# Patient Record
Sex: MALE
[De-identification: names, ages, dates, MRNs, and addresses within clinical notes are randomized per-mention and may not be internally consistent; named-entity substitution may affect disease eponyms.]

---

## 2017-03-08 ENCOUNTER — NON-APPOINTMENT (OUTPATIENT)
Age: 62
End: 2017-03-08

## 2017-03-08 ENCOUNTER — APPOINTMENT (OUTPATIENT)
Dept: ELECTROPHYSIOLOGY | Facility: CLINIC | Age: 62
End: 2017-03-08

## 2017-03-08 VITALS
BODY MASS INDEX: 26.28 KG/M2 | DIASTOLIC BLOOD PRESSURE: 70 MMHG | HEART RATE: 65 BPM | SYSTOLIC BLOOD PRESSURE: 101 MMHG | WEIGHT: 194 LBS | HEIGHT: 72 IN

## 2017-06-15 ENCOUNTER — APPOINTMENT (OUTPATIENT)
Dept: ELECTROPHYSIOLOGY | Facility: CLINIC | Age: 62
End: 2017-06-15

## 2017-09-13 ENCOUNTER — APPOINTMENT (OUTPATIENT)
Dept: ELECTROPHYSIOLOGY | Facility: CLINIC | Age: 62
End: 2017-09-13
Payer: COMMERCIAL

## 2017-09-13 VITALS — DIASTOLIC BLOOD PRESSURE: 64 MMHG | HEART RATE: 59 BPM | OXYGEN SATURATION: 97 % | SYSTOLIC BLOOD PRESSURE: 98 MMHG

## 2017-09-13 PROCEDURE — 93283 PRGRMG EVAL IMPLANTABLE DFB: CPT

## 2017-12-14 ENCOUNTER — APPOINTMENT (OUTPATIENT)
Dept: ELECTROPHYSIOLOGY | Facility: CLINIC | Age: 62
End: 2017-12-14

## 2017-12-14 ENCOUNTER — APPOINTMENT (OUTPATIENT)
Dept: ELECTROPHYSIOLOGY | Facility: CLINIC | Age: 62
End: 2017-12-14
Payer: COMMERCIAL

## 2017-12-14 PROCEDURE — 93295 DEV INTERROG REMOTE 1/2/MLT: CPT

## 2018-01-17 ENCOUNTER — RX RENEWAL (OUTPATIENT)
Age: 63
End: 2018-01-17

## 2018-02-01 ENCOUNTER — APPOINTMENT (OUTPATIENT)
Dept: CARDIOLOGY | Facility: CLINIC | Age: 63
End: 2018-02-01
Payer: COMMERCIAL

## 2018-02-01 VITALS
HEIGHT: 72 IN | HEART RATE: 68 BPM | BODY MASS INDEX: 28.17 KG/M2 | DIASTOLIC BLOOD PRESSURE: 70 MMHG | WEIGHT: 208 LBS | RESPIRATION RATE: 15 BRPM | SYSTOLIC BLOOD PRESSURE: 100 MMHG

## 2018-02-01 DIAGNOSIS — Z78.9 OTHER SPECIFIED HEALTH STATUS: ICD-10-CM

## 2018-02-01 DIAGNOSIS — Z83.49 FAMILY HISTORY OF OTHER ENDOCRINE, NUTRITIONAL AND METABOLIC DISEASES: ICD-10-CM

## 2018-02-01 PROCEDURE — 99213 OFFICE O/P EST LOW 20 MIN: CPT

## 2018-02-15 ENCOUNTER — APPOINTMENT (OUTPATIENT)
Dept: CARDIOLOGY | Facility: CLINIC | Age: 63
End: 2018-02-15

## 2018-03-28 ENCOUNTER — APPOINTMENT (OUTPATIENT)
Dept: CARDIOLOGY | Facility: CLINIC | Age: 63
End: 2018-03-28
Payer: COMMERCIAL

## 2018-03-28 PROCEDURE — 93922 UPR/L XTREMITY ART 2 LEVELS: CPT

## 2018-03-28 PROCEDURE — 93306 TTE W/DOPPLER COMPLETE: CPT

## 2018-04-17 ENCOUNTER — RX RENEWAL (OUTPATIENT)
Age: 63
End: 2018-04-17

## 2018-04-18 ENCOUNTER — APPOINTMENT (OUTPATIENT)
Dept: ELECTROPHYSIOLOGY | Facility: CLINIC | Age: 63
End: 2018-04-18
Payer: COMMERCIAL

## 2018-04-18 VITALS
DIASTOLIC BLOOD PRESSURE: 65 MMHG | HEART RATE: 65 BPM | WEIGHT: 210 LBS | BODY MASS INDEX: 28.44 KG/M2 | SYSTOLIC BLOOD PRESSURE: 97 MMHG | HEIGHT: 72 IN | OXYGEN SATURATION: 97 %

## 2018-04-18 PROCEDURE — 93283 PRGRMG EVAL IMPLANTABLE DFB: CPT

## 2018-05-17 ENCOUNTER — APPOINTMENT (OUTPATIENT)
Dept: CARDIOLOGY | Facility: CLINIC | Age: 63
End: 2018-05-17
Payer: COMMERCIAL

## 2018-05-17 VITALS
HEART RATE: 66 BPM | DIASTOLIC BLOOD PRESSURE: 78 MMHG | RESPIRATION RATE: 15 BRPM | SYSTOLIC BLOOD PRESSURE: 110 MMHG | HEIGHT: 72 IN | BODY MASS INDEX: 29.53 KG/M2 | WEIGHT: 218 LBS

## 2018-05-17 PROCEDURE — 99214 OFFICE O/P EST MOD 30 MIN: CPT

## 2018-06-21 ENCOUNTER — APPOINTMENT (OUTPATIENT)
Dept: ELECTROPHYSIOLOGY | Facility: CLINIC | Age: 63
End: 2018-06-21

## 2018-07-19 DIAGNOSIS — R06.83 SNORING: ICD-10-CM

## 2018-07-19 DIAGNOSIS — E87.70 FLUID OVERLOAD, UNSPECIFIED: ICD-10-CM

## 2018-08-08 ENCOUNTER — APPOINTMENT (OUTPATIENT)
Dept: PULMONOLOGY | Facility: CLINIC | Age: 63
End: 2018-08-08

## 2018-09-13 ENCOUNTER — APPOINTMENT (OUTPATIENT)
Dept: CARDIOLOGY | Facility: CLINIC | Age: 63
End: 2018-09-13

## 2018-09-27 ENCOUNTER — APPOINTMENT (OUTPATIENT)
Dept: CARDIOLOGY | Facility: CLINIC | Age: 63
End: 2018-09-27
Payer: MEDICARE

## 2018-09-27 VITALS
HEART RATE: 56 BPM | WEIGHT: 224 LBS | BODY MASS INDEX: 30.34 KG/M2 | RESPIRATION RATE: 15 BRPM | SYSTOLIC BLOOD PRESSURE: 118 MMHG | DIASTOLIC BLOOD PRESSURE: 79 MMHG | HEIGHT: 72 IN

## 2018-09-27 PROCEDURE — 99214 OFFICE O/P EST MOD 30 MIN: CPT

## 2018-10-17 ENCOUNTER — APPOINTMENT (OUTPATIENT)
Dept: ELECTROPHYSIOLOGY | Facility: CLINIC | Age: 63
End: 2018-10-17
Payer: COMMERCIAL

## 2018-10-17 VITALS
WEIGHT: 224 LBS | HEIGHT: 72 IN | HEART RATE: 70 BPM | DIASTOLIC BLOOD PRESSURE: 67 MMHG | OXYGEN SATURATION: 98 % | BODY MASS INDEX: 30.34 KG/M2 | SYSTOLIC BLOOD PRESSURE: 98 MMHG

## 2018-10-17 PROCEDURE — 93283 PRGRMG EVAL IMPLANTABLE DFB: CPT

## 2018-11-28 ENCOUNTER — APPOINTMENT (OUTPATIENT)
Dept: PULMONOLOGY | Facility: CLINIC | Age: 63
End: 2018-11-28
Payer: MEDICARE

## 2018-11-28 VITALS — DIASTOLIC BLOOD PRESSURE: 77 MMHG | HEART RATE: 65 BPM | OXYGEN SATURATION: 96 % | SYSTOLIC BLOOD PRESSURE: 103 MMHG

## 2018-11-28 DIAGNOSIS — G47.33 OBSTRUCTIVE SLEEP APNEA (ADULT) (PEDIATRIC): ICD-10-CM

## 2018-11-28 DIAGNOSIS — J44.9 CHRONIC OBSTRUCTIVE PULMONARY DISEASE, UNSPECIFIED: ICD-10-CM

## 2018-11-28 DIAGNOSIS — Z23 ENCOUNTER FOR IMMUNIZATION: ICD-10-CM

## 2018-11-28 PROCEDURE — 99214 OFFICE O/P EST MOD 30 MIN: CPT | Mod: 25

## 2018-11-28 PROCEDURE — G0008: CPT

## 2018-11-28 PROCEDURE — 94060 EVALUATION OF WHEEZING: CPT

## 2018-11-28 PROCEDURE — 94727 GAS DIL/WSHOT DETER LNG VOL: CPT

## 2018-11-28 PROCEDURE — 94729 DIFFUSING CAPACITY: CPT | Mod: 59

## 2018-11-28 PROCEDURE — 90662 IIV NO PRSV INCREASED AG IM: CPT

## 2018-11-30 ENCOUNTER — RX RENEWAL (OUTPATIENT)
Age: 63
End: 2018-11-30

## 2018-12-13 ENCOUNTER — APPOINTMENT (OUTPATIENT)
Dept: CARDIOLOGY | Facility: CLINIC | Age: 63
End: 2018-12-13
Payer: MEDICARE

## 2018-12-13 VITALS
SYSTOLIC BLOOD PRESSURE: 118 MMHG | HEART RATE: 65 BPM | HEIGHT: 72 IN | RESPIRATION RATE: 15 BRPM | DIASTOLIC BLOOD PRESSURE: 78 MMHG | WEIGHT: 228 LBS | BODY MASS INDEX: 30.88 KG/M2

## 2018-12-13 PROCEDURE — 99214 OFFICE O/P EST MOD 30 MIN: CPT

## 2018-12-13 NOTE — DISCUSSION/SUMMARY
[FreeTextEntry1] : This is a 63-year-old male with past medical history significant for coronary artery disease status post ST elevation myocardial infarction, followed by emergent coronary artery bypass surgery May 27, 2016 at [facility], left ventricular dysfunction, history of hypertension, hyperlipidemia, mitral valve regurgitation, status post AICD, comes in for Followup cardiac evaluation. \par He denies chest pain, shortness of breath, dizziness or syncope.\par The patient is doing well from a cardiac standpoint.. He will followup with the heart failure team at [facility]. He will also followup with his electrophysiologist regarding his AICD.\par \par The patient feels well at the current time. His LDL cholesterol is still not at goal. I will add Zetia 10 mg daily to her medical regimen. He will have repeat blood work in 6-8 weeks. His LDL cholesterol goal should be less than 70 mg/dL. He will followup with electrophysiology team to check his pacemaker. He will followup with me as noted above. He will continue on his other medications.\par \par The patient is on Entresto and followed closely by Dr. Bennett of [facility]. \par The patient had an echo Doppler examination November 2016 which demonstrated severe left ventricular dysfunction with an estimated ejection fraction 37% the patient had moderate to severe mitral valve regurgitation, moderate tricuspid valve regurgitation. He is feeling well at the current time. A cardiac catheterization done at [facility] May 27, 2016 demonstrated  May 27, 2016 demonstrated large thrombus in the distal left main coronary artery, and the patient had an emergent thrombectomy and angioplasty. He was placed on intra-aortic balloon pump. He was noted to have severe mitral valve regurgitation at that time in the setting of his ST elevation myocardial infarction, 50% lesion the mid left anterior descending artery, 60% lesion the second diagonal artery, 50% lesion in the ramus branch, and 40% lesion in the posterior descending artery.\par \par The patient understands that aerobic exercises must be increased to 40 minutes 4 times per week. A detailed discussion of lifestyle modification was done today. The patient has a good understanding of the diagnosis, and treatment plan. Lifestyle modification was also outlined.

## 2018-12-13 NOTE — ASSESSMENT
[FreeTextEntry1] : Pt. most recent bloodwork done 11/02/18, shows a calculated LDL score of 79. Pt's LDL goal should be under 70. Pt. is already on Liptor 80mg, we will now add Zetia 10mg.

## 2018-12-13 NOTE — PHYSICAL EXAM
[General Appearance - Well Developed] : well developed [General Appearance - Well Nourished] : well nourished [Normal Conjunctiva] : the conjunctiva exhibited no abnormalities [Normal Oral Mucosa] : normal oral mucosa [Normal Jugular Venous A Waves Present] : normal jugular venous A waves present [Normal Jugular Venous V Waves Present] : normal jugular venous V waves present [No Jugular Venous Blanco A Waves] : no jugular venous blanco A waves [Respiration, Rhythm And Depth] : normal respiratory rhythm and effort [Exaggerated Use Of Accessory Muscles For Inspiration] : no accessory muscle use [Auscultation Breath Sounds / Voice Sounds] : lungs were clear to auscultation bilaterally [Bowel Sounds] : normal bowel sounds [Abdomen Soft] : soft [Abnormal Walk] : normal gait [Gait - Sufficient For Exercise Testing] : the gait was sufficient for exercise testing [Nail Clubbing] : no clubbing of the fingernails [Cyanosis, Localized] : no localized cyanosis [Skin Color & Pigmentation] : normal skin color and pigmentation [] : no rash [Oriented To Time, Place, And Person] : oriented to person, place, and time [Affect] : the affect was normal [Mood] : the mood was normal [No Anxiety] : not feeling anxious [5th Left ICS - MCL] : palpated at the 5th LICS in the midclavicular line [Normal] : normal [No Precordial Heave] : no precordial heave was noted [Normal Rate] : normal [Rhythm Regular] : regular [Normal S1] : normal S1 [Normal S2] : normal S2 [No Gallop] : no gallop heard [I] : a grade 1 [2+] : left 2+ [No Pitting Edema] : no pitting edema present [Apical Thrill] : no thrill palpable at the apex [Click] : no click [Pericardial Rub] : no pericardial rub

## 2018-12-13 NOTE — REASON FOR VISIT
[Follow-Up - Clinic] : a clinic follow-up of [Coronary Artery Disease] : coronary artery disease [Hyperlipidemia] : hyperlipidemia [Hypertension] : hypertension [FreeTextEntry1] : 63-year-old male with past medical history significant for coronary artery disease status post ST elevation myocardial infarction, followed by emergent coronary artery bypass surgery May 27, 2016 at [facility], left ventricular dysfunction, history of hypertension, hyperlipidemia, mitral valve regurgitation, status post AICD, comes in for evaluation.

## 2018-12-18 RX ORDER — UMECLIDINIUM 62.5 UG/1
62.5 AEROSOL, POWDER ORAL DAILY
Qty: 1 | Refills: 3 | Status: COMPLETED | COMMUNITY
Start: 2018-11-30 | End: 2018-12-18

## 2018-12-27 ENCOUNTER — APPOINTMENT (OUTPATIENT)
Dept: ELECTROPHYSIOLOGY | Facility: CLINIC | Age: 63
End: 2018-12-27
Payer: MEDICARE

## 2018-12-27 PROCEDURE — XXXXX: CPT

## 2019-04-04 ENCOUNTER — APPOINTMENT (OUTPATIENT)
Dept: CARDIOLOGY | Facility: CLINIC | Age: 64
End: 2019-04-04
Payer: MEDICARE

## 2019-04-04 VITALS
DIASTOLIC BLOOD PRESSURE: 78 MMHG | BODY MASS INDEX: 31.29 KG/M2 | WEIGHT: 231 LBS | RESPIRATION RATE: 15 BRPM | SYSTOLIC BLOOD PRESSURE: 108 MMHG | HEART RATE: 62 BPM | HEIGHT: 72 IN

## 2019-04-04 PROCEDURE — 99214 OFFICE O/P EST MOD 30 MIN: CPT

## 2019-04-04 NOTE — DISCUSSION/SUMMARY
[FreeTextEntry1] : This is a 63-year-old male with past medical history significant for coronary artery disease status post ST elevation myocardial infarction, followed by emergent coronary artery bypass surgery May 27, 2016 at [facility], left ventricular dysfunction, history of hypertension, hyperlipidemia, mitral valve regurgitation, status post AICD, comes in for Followup cardiac evaluation. \par He denies chest pain, shortness of breath, dizziness or syncope.\par He will follow up with the heart failure team at [facility].  He will also go for blood work in the next month to check his electrolytes, and lipid panel.  He will remain on his usual medications.  He reports that the Zetia generic is becoming too expensive.  I have asked him to check up bracing and other pharmacies.\par He will followup with me in 3 months.  Lifestyle modification including salt restriction, and regular aerobic activity will reinforced.\par His LDL cholesterol goal should be less than 70 mg/dL.  This study it turns out to be too expensive, I would then recommend discontinuing Lipitor 80 mg daily and place him on Crestor 40 mg per day and consider the addition of WelChol.\par PMH:\par The patient is on Entresto and followed closely by Dr. Bennett of [facility]. \par The patient had an echo Doppler examination November 2016 which demonstrated severe left ventricular dysfunction with an estimated ejection fraction 37% the patient had moderate to severe mitral valve regurgitation, moderate tricuspid valve regurgitation. He is feeling well at the current time. A cardiac catheterization done at [facility] May 27, 2016 demonstrated  May 27, 2016 demonstrated large thrombus in the distal left main coronary artery, and the patient had an emergent thrombectomy and angioplasty. He was placed on intra-aortic balloon pump. He was noted to have severe mitral valve regurgitation at that time in the setting of his ST elevation myocardial infarction, 50% lesion the mid left anterior descending artery, 60% lesion the second diagonal artery, 50% lesion in the ramus branch, and 40% lesion in the posterior descending artery.\par \par The patient understands that aerobic exercises must be increased to 40 minutes 4 times per week. A detailed discussion of lifestyle modification was done today. The patient has a good understanding of the diagnosis, and treatment plan. Lifestyle modification was also outlined.

## 2019-04-04 NOTE — REASON FOR VISIT
[Follow-Up - Clinic] : a clinic follow-up of [Coronary Artery Disease] : coronary artery disease [Hyperlipidemia] : hyperlipidemia [Hypertension] : hypertension [FreeTextEntry1] : 63-year-old male with past medical history significant for coronary artery disease status post ST elevation myocardial infarction, followed by emergent coronary artery bypass surgery May 27, 2016 at [facility], left ventricular dysfunction, history of hypertension, hyperlipidemia, mitral valve regurgitation, status post AICD, comes in for evaluation.

## 2019-04-17 ENCOUNTER — APPOINTMENT (OUTPATIENT)
Dept: ELECTROPHYSIOLOGY | Facility: CLINIC | Age: 64
End: 2019-04-17

## 2019-04-17 ENCOUNTER — APPOINTMENT (OUTPATIENT)
Dept: ELECTROPHYSIOLOGY | Facility: CLINIC | Age: 64
End: 2019-04-17
Payer: MEDICARE

## 2019-04-17 VITALS
HEIGHT: 72 IN | WEIGHT: 225 LBS | DIASTOLIC BLOOD PRESSURE: 71 MMHG | SYSTOLIC BLOOD PRESSURE: 106 MMHG | HEART RATE: 70 BPM | OXYGEN SATURATION: 96 % | BODY MASS INDEX: 30.48 KG/M2

## 2019-04-17 PROCEDURE — 93283 PRGRMG EVAL IMPLANTABLE DFB: CPT

## 2019-07-18 ENCOUNTER — APPOINTMENT (OUTPATIENT)
Dept: ELECTROPHYSIOLOGY | Facility: CLINIC | Age: 64
End: 2019-07-18

## 2019-09-05 ENCOUNTER — APPOINTMENT (OUTPATIENT)
Dept: CARDIOLOGY | Facility: CLINIC | Age: 64
End: 2019-09-05
Payer: MEDICARE

## 2019-09-05 VITALS
HEART RATE: 70 BPM | BODY MASS INDEX: 30.75 KG/M2 | WEIGHT: 227 LBS | SYSTOLIC BLOOD PRESSURE: 112 MMHG | HEIGHT: 72 IN | DIASTOLIC BLOOD PRESSURE: 74 MMHG | RESPIRATION RATE: 16 BRPM

## 2019-09-05 PROCEDURE — 93000 ELECTROCARDIOGRAM COMPLETE: CPT

## 2019-09-05 PROCEDURE — 99214 OFFICE O/P EST MOD 30 MIN: CPT

## 2019-09-09 RX ORDER — SACUBITRIL AND VALSARTAN 97; 103 MG/1; MG/1
97-103 TABLET, FILM COATED ORAL TWICE DAILY
Refills: 0 | Status: ACTIVE | COMMUNITY

## 2019-10-28 ENCOUNTER — APPOINTMENT (OUTPATIENT)
Dept: ELECTROPHYSIOLOGY | Facility: CLINIC | Age: 64
End: 2019-10-28
Payer: MEDICARE

## 2019-10-28 VITALS
DIASTOLIC BLOOD PRESSURE: 71 MMHG | HEIGHT: 72 IN | BODY MASS INDEX: 30.88 KG/M2 | SYSTOLIC BLOOD PRESSURE: 105 MMHG | OXYGEN SATURATION: 97 % | WEIGHT: 228 LBS | HEART RATE: 87 BPM

## 2019-10-28 PROCEDURE — 93283 PRGRMG EVAL IMPLANTABLE DFB: CPT

## 2019-11-27 RX ORDER — EZETIMIBE 10 MG/1
10 TABLET ORAL
Qty: 90 | Refills: 1 | Status: ACTIVE | COMMUNITY
Start: 2018-12-13 | End: 1900-01-01

## 2020-03-05 ENCOUNTER — APPOINTMENT (OUTPATIENT)
Dept: CARDIOLOGY | Facility: CLINIC | Age: 65
End: 2020-03-05
Payer: MEDICARE

## 2020-03-05 VITALS
RESPIRATION RATE: 16 BRPM | DIASTOLIC BLOOD PRESSURE: 72 MMHG | WEIGHT: 229 LBS | SYSTOLIC BLOOD PRESSURE: 105 MMHG | HEART RATE: 67 BPM | HEIGHT: 72 IN | BODY MASS INDEX: 31.02 KG/M2

## 2020-03-05 PROCEDURE — 93000 ELECTROCARDIOGRAM COMPLETE: CPT

## 2020-03-05 PROCEDURE — 99214 OFFICE O/P EST MOD 30 MIN: CPT

## 2020-03-22 ENCOUNTER — INPATIENT (INPATIENT)
Facility: HOSPITAL | Age: 65
LOS: 3 days | Discharge: HOME | DRG: 372 | End: 2020-03-26
Attending: INTERNAL MEDICINE | Admitting: INTERNAL MEDICINE
Payer: MEDICARE

## 2020-03-22 VITALS
TEMPERATURE: 98 F | SYSTOLIC BLOOD PRESSURE: 90 MMHG | OXYGEN SATURATION: 96 % | HEART RATE: 98 BPM | RESPIRATION RATE: 20 BRPM | DIASTOLIC BLOOD PRESSURE: 65 MMHG

## 2020-03-22 DIAGNOSIS — R55 SYNCOPE AND COLLAPSE: ICD-10-CM

## 2020-03-22 DIAGNOSIS — Z95.1 PRESENCE OF AORTOCORONARY BYPASS GRAFT: Chronic | ICD-10-CM

## 2020-03-22 LAB
ALBUMIN SERPL ELPH-MCNC: 3.6 G/DL — SIGNIFICANT CHANGE UP (ref 3.3–5)
ALP SERPL-CCNC: 31 U/L — LOW (ref 40–120)
ALT FLD-CCNC: 39 U/L — SIGNIFICANT CHANGE UP (ref 10–45)
ANION GAP SERPL CALC-SCNC: 17 MMOL/L — SIGNIFICANT CHANGE UP (ref 5–17)
APPEARANCE UR: CLEAR — SIGNIFICANT CHANGE UP
APTT BLD: 34.5 SEC — SIGNIFICANT CHANGE UP (ref 27.5–36.3)
AST SERPL-CCNC: 36 U/L — SIGNIFICANT CHANGE UP (ref 10–40)
BACTERIA # UR AUTO: NEGATIVE — SIGNIFICANT CHANGE UP
BASOPHILS # BLD AUTO: 0.02 K/UL — SIGNIFICANT CHANGE UP (ref 0–0.2)
BASOPHILS NFR BLD AUTO: 0.2 % — SIGNIFICANT CHANGE UP (ref 0–2)
BILIRUB SERPL-MCNC: 1 MG/DL — SIGNIFICANT CHANGE UP (ref 0.2–1.2)
BILIRUB UR-MCNC: NEGATIVE — SIGNIFICANT CHANGE UP
BLD GP AB SCN SERPL QL: NEGATIVE — SIGNIFICANT CHANGE UP
BUN SERPL-MCNC: 40 MG/DL — HIGH (ref 7–23)
C DIFF BY PCR RESULT: DETECTED
C DIFF GDH STL QL: SIGNIFICANT CHANGE UP
C DIFF GDH STL QL: SIGNIFICANT CHANGE UP
C DIFF TOX GENS STL QL NAA+PROBE: SIGNIFICANT CHANGE UP
CALCIUM SERPL-MCNC: 8.3 MG/DL — LOW (ref 8.4–10.5)
CHLORIDE SERPL-SCNC: 97 MMOL/L — SIGNIFICANT CHANGE UP (ref 96–108)
CO2 SERPL-SCNC: 18 MMOL/L — LOW (ref 22–31)
COLOR SPEC: YELLOW — SIGNIFICANT CHANGE UP
CREAT SERPL-MCNC: 1.6 MG/DL — HIGH (ref 0.5–1.3)
DIFF PNL FLD: NEGATIVE — SIGNIFICANT CHANGE UP
EOSINOPHIL # BLD AUTO: 0.08 K/UL — SIGNIFICANT CHANGE UP (ref 0–0.5)
EOSINOPHIL NFR BLD AUTO: 0.7 % — SIGNIFICANT CHANGE UP (ref 0–6)
EPI CELLS # UR: 1 /HPF — SIGNIFICANT CHANGE UP
GAS PNL BLDV: SIGNIFICANT CHANGE UP
GAS PNL BLDV: SIGNIFICANT CHANGE UP
GLUCOSE SERPL-MCNC: 131 MG/DL — HIGH (ref 70–99)
GLUCOSE UR QL: NEGATIVE — SIGNIFICANT CHANGE UP
HCT VFR BLD CALC: 46.1 % — SIGNIFICANT CHANGE UP (ref 39–50)
HGB BLD-MCNC: 15.4 G/DL — SIGNIFICANT CHANGE UP (ref 13–17)
HYALINE CASTS # UR AUTO: 2 /LPF — SIGNIFICANT CHANGE UP (ref 0–2)
IMM GRANULOCYTES NFR BLD AUTO: 0.3 % — SIGNIFICANT CHANGE UP (ref 0–1.5)
INR BLD: 1.16 RATIO — SIGNIFICANT CHANGE UP (ref 0.88–1.16)
KETONES UR-MCNC: NEGATIVE — SIGNIFICANT CHANGE UP
LEUKOCYTE ESTERASE UR-ACNC: NEGATIVE — SIGNIFICANT CHANGE UP
LYMPHOCYTES # BLD AUTO: 1.21 K/UL — SIGNIFICANT CHANGE UP (ref 1–3.3)
LYMPHOCYTES # BLD AUTO: 10.1 % — LOW (ref 13–44)
MCHC RBC-ENTMCNC: 29.7 PG — SIGNIFICANT CHANGE UP (ref 27–34)
MCHC RBC-ENTMCNC: 33.4 GM/DL — SIGNIFICANT CHANGE UP (ref 32–36)
MCV RBC AUTO: 88.8 FL — SIGNIFICANT CHANGE UP (ref 80–100)
MONOCYTES # BLD AUTO: 0.92 K/UL — HIGH (ref 0–0.9)
MONOCYTES NFR BLD AUTO: 7.7 % — SIGNIFICANT CHANGE UP (ref 2–14)
NEUTROPHILS # BLD AUTO: 9.71 K/UL — HIGH (ref 1.8–7.4)
NEUTROPHILS NFR BLD AUTO: 81 % — HIGH (ref 43–77)
NITRITE UR-MCNC: NEGATIVE — SIGNIFICANT CHANGE UP
NRBC # BLD: 0 /100 WBCS — SIGNIFICANT CHANGE UP (ref 0–0)
PH UR: 6 — SIGNIFICANT CHANGE UP (ref 5–8)
PLATELET # BLD AUTO: 138 K/UL — LOW (ref 150–400)
POTASSIUM SERPL-MCNC: 4.8 MMOL/L — SIGNIFICANT CHANGE UP (ref 3.5–5.3)
POTASSIUM SERPL-SCNC: 4.8 MMOL/L — SIGNIFICANT CHANGE UP (ref 3.5–5.3)
PROT SERPL-MCNC: 6.7 G/DL — SIGNIFICANT CHANGE UP (ref 6–8.3)
PROT UR-MCNC: ABNORMAL
PROTHROM AB SERPL-ACNC: 13.3 SEC — HIGH (ref 10–12.9)
RBC # BLD: 5.19 M/UL — SIGNIFICANT CHANGE UP (ref 4.2–5.8)
RBC # FLD: 15.5 % — HIGH (ref 10.3–14.5)
RBC CASTS # UR COMP ASSIST: 1 /HPF — SIGNIFICANT CHANGE UP (ref 0–4)
RH IG SCN BLD-IMP: POSITIVE — SIGNIFICANT CHANGE UP
SODIUM SERPL-SCNC: 132 MMOL/L — LOW (ref 135–145)
SP GR SPEC: 1.04 — HIGH (ref 1.01–1.02)
TROPONIN T, HIGH SENSITIVITY RESULT: 21 NG/L — SIGNIFICANT CHANGE UP (ref 0–51)
UROBILINOGEN FLD QL: NEGATIVE — SIGNIFICANT CHANGE UP
WBC # BLD: 11.97 K/UL — HIGH (ref 3.8–10.5)
WBC # FLD AUTO: 11.97 K/UL — HIGH (ref 3.8–10.5)
WBC UR QL: 1 /HPF — SIGNIFICANT CHANGE UP (ref 0–5)

## 2020-03-22 PROCEDURE — 70450 CT HEAD/BRAIN W/O DYE: CPT | Mod: 26

## 2020-03-22 PROCEDURE — 71260 CT THORAX DX C+: CPT | Mod: 26

## 2020-03-22 PROCEDURE — 93010 ELECTROCARDIOGRAM REPORT: CPT

## 2020-03-22 PROCEDURE — 74177 CT ABD & PELVIS W/CONTRAST: CPT | Mod: 26

## 2020-03-22 PROCEDURE — 71045 X-RAY EXAM CHEST 1 VIEW: CPT | Mod: 26

## 2020-03-22 PROCEDURE — 99221 1ST HOSP IP/OBS SF/LOW 40: CPT | Mod: AI

## 2020-03-22 PROCEDURE — 99285 EMERGENCY DEPT VISIT HI MDM: CPT

## 2020-03-22 RX ORDER — ATORVASTATIN CALCIUM 80 MG/1
80 TABLET, FILM COATED ORAL AT BEDTIME
Refills: 0 | Status: DISCONTINUED | OUTPATIENT
Start: 2020-03-22 | End: 2020-03-26

## 2020-03-22 RX ORDER — SODIUM CHLORIDE 9 MG/ML
1000 INJECTION INTRAMUSCULAR; INTRAVENOUS; SUBCUTANEOUS
Refills: 0 | Status: DISCONTINUED | OUTPATIENT
Start: 2020-03-22 | End: 2020-03-23

## 2020-03-22 RX ORDER — SODIUM CHLORIDE 9 MG/ML
2000 INJECTION INTRAMUSCULAR; INTRAVENOUS; SUBCUTANEOUS ONCE
Refills: 0 | Status: COMPLETED | OUTPATIENT
Start: 2020-03-22 | End: 2020-03-22

## 2020-03-22 RX ORDER — CLOPIDOGREL BISULFATE 75 MG/1
75 TABLET, FILM COATED ORAL DAILY
Refills: 0 | Status: DISCONTINUED | OUTPATIENT
Start: 2020-03-22 | End: 2020-03-26

## 2020-03-22 RX ORDER — FUROSEMIDE 40 MG
40 TABLET ORAL DAILY
Refills: 0 | Status: DISCONTINUED | OUTPATIENT
Start: 2020-03-22 | End: 2020-03-22

## 2020-03-22 RX ORDER — SODIUM CHLORIDE 9 MG/ML
250 INJECTION INTRAMUSCULAR; INTRAVENOUS; SUBCUTANEOUS ONCE
Refills: 0 | Status: COMPLETED | OUTPATIENT
Start: 2020-03-22 | End: 2020-03-22

## 2020-03-22 RX ORDER — LISINOPRIL 2.5 MG/1
2.5 TABLET ORAL DAILY
Refills: 0 | Status: DISCONTINUED | OUTPATIENT
Start: 2020-03-22 | End: 2020-03-22

## 2020-03-22 RX ORDER — HEPARIN SODIUM 5000 [USP'U]/ML
5000 INJECTION INTRAVENOUS; SUBCUTANEOUS EVERY 12 HOURS
Refills: 0 | Status: DISCONTINUED | OUTPATIENT
Start: 2020-03-22 | End: 2020-03-26

## 2020-03-22 RX ORDER — CARVEDILOL PHOSPHATE 80 MG/1
3.12 CAPSULE, EXTENDED RELEASE ORAL EVERY 12 HOURS
Refills: 0 | Status: DISCONTINUED | OUTPATIENT
Start: 2020-03-22 | End: 2020-03-22

## 2020-03-22 RX ORDER — ASPIRIN/CALCIUM CARB/MAGNESIUM 324 MG
81 TABLET ORAL DAILY
Refills: 0 | Status: DISCONTINUED | OUTPATIENT
Start: 2020-03-22 | End: 2020-03-26

## 2020-03-22 RX ADMIN — CLOPIDOGREL BISULFATE 75 MILLIGRAM(S): 75 TABLET, FILM COATED ORAL at 12:20

## 2020-03-22 RX ADMIN — Medication 81 MILLIGRAM(S): at 12:20

## 2020-03-22 RX ADMIN — SODIUM CHLORIDE 50 MILLILITER(S): 9 INJECTION INTRAMUSCULAR; INTRAVENOUS; SUBCUTANEOUS at 13:46

## 2020-03-22 RX ADMIN — HEPARIN SODIUM 5000 UNIT(S): 5000 INJECTION INTRAVENOUS; SUBCUTANEOUS at 17:37

## 2020-03-22 RX ADMIN — SODIUM CHLORIDE 250 MILLILITER(S): 9 INJECTION INTRAMUSCULAR; INTRAVENOUS; SUBCUTANEOUS at 12:18

## 2020-03-22 RX ADMIN — ATORVASTATIN CALCIUM 80 MILLIGRAM(S): 80 TABLET, FILM COATED ORAL at 21:10

## 2020-03-22 RX ADMIN — SODIUM CHLORIDE 2000 MILLILITER(S): 9 INJECTION INTRAMUSCULAR; INTRAVENOUS; SUBCUTANEOUS at 04:20

## 2020-03-22 NOTE — ED PROVIDER NOTE - PROGRESS NOTE DETAILS
Anuj SHETH MD PGY2: Patient's BP now stable at ~100/70. Patient with scant B lines, will hold fluid resuscitation for now. Given recurrent episodes of syncope, will admit for tele monitoring and fluids. Spoke to Dr Valentino whom accepted the admission. WIll admit to tele for syncope in a patient with AICD and significant medical history. Anuj SHETH MD PGY2: Patient was verbaly informed of the findings of his CT in relation to his gallbladder. Informed that it is likely contracted, but adenomyomatosis or gallbladder ca cannot be excluded. Patient verbalized that he needs to follow-up with a gastroenterologist ASAP after discharge from hospital for possible more advanced imaging.

## 2020-03-22 NOTE — ED ADULT NURSE NOTE - OBJECTIVE STATEMENT
63 yo M pmh of MI, stents placed, CHF, on lasix, on blood thinners, HTN brought in via EMS from home s/p syncope with 3 days of dark brown watery diarrhea. 63 yo M pmh of MI, stents placed, CHF, on lasix, on blood thinners, HTN brought in via EMS from home s/p syncope tonight "lasting a few minutes" after 3 days of dark brown watery diarrhea. As per EMS, pt received a liter of NS prior to arrival and felt better. Denies CP, back pain, SOB, fevers/chills, n/v, lightheadedness, dizziness, hitting his head.  A&Ox4, placed on CM, EKG performed, NSR.  Pt hypotensive on arrival, secondary IV established, labs drawn.  Skin w/d/i.  afebrile. Safety and comfort maintained. Will continue to monitor.

## 2020-03-22 NOTE — H&P ADULT - ASSESSMENT
: 64 M    h/o   MI/ stemi  2016 .  s/p    cardiogenic shock ,   CABG.  HLD.   s/p AICD placement  h/o low  EF, OF  20,   severe  MR. mod  pulm htn. in 2016  COPD.  OBESITY       here for 2 episode of syncope   in the setting of diarrhea x 2 days.      Has been having 3 episodes of profuse loose stools. No recent abx use.     Had two episodes of passing out, each time while he was standing up from a lying down   tele    orthostatic   low  sbp.  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 No pneumothorax. No pneumomediastinum.   Moderate upper lobe predominant centrilobular emphysema. No evidence of pneumonia.  Prominent gallbladder wall thickening in the body and fundus of the gallbladder up to 1 cm, which may be related to collapse, possibly adenomyomatosis; consider nonemergent contrast enhanced MRI for further evaluation as indicated.  < end of copied text >       < from: Transthoracic Echocardiogram (11.20.16 @ 18:09) >  1. Mitral annular calcification. Tethered mitral valve  leaflets with normal opening. Moderate-severe mitral  regurgitation.  2. Eccentric left ventricular hypertrophy (dilated left  ventricle with normal relative wall thickness).  3. Endocardial visualization enhanced with intravenous  injection of echo contrast (Definity). Severe global left  ventricular systolic dysfunction.  4. Normal right ventricular size with decreased right  ventricular systolic function.  5. Estimated right ventricular systolic pressure equals 58  mm Hg, assuming right atrial pressure equals 8 mm Hg,  < end of copied text > : 64 M    h/o   MI/ stemi  2016 .  s/p    cardiogenic shock ,   CABG.  HLD.   s/p AICD placement  h/o low  EF, OF  20,   severe  MR. mod  pulm htn. in 2016  COPD.  OBESITY       here for 2 episode of syncope   in the setting of diarrhea x 2 days.      Has been having 3 episodes of profuse loose stools. No recent abx use.     Had two episodes of passing out, each time while he was standing up from a lying down   tele    orthostatic   low  sbp.  hold  aldactone/ lasix  and lisinopril   for now   on asa/ plavix/  statin/  coreg   gentle  hydration  ct head, pending    ct chest/ abdomen,    sternal  dehiscence /  emphysema/ GB  adenomyosis  gi  eval/  ID eval. 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 No pneumothorax. No pneumomediastinum.   Moderate upper lobe predominant centrilobular emphysema. No evidence of pneumonia.  Prominent gallbladder wall thickening in the body and fundus of the gallbladder up to 1 cm, which may be related to collapse, possibly adenomyomatosis; consider nonemergent contrast enhanced MRI for further evaluation as indicated.  < end of copied text >       < from: Transthoracic Echocardiogram (11.20.16 @ 18:09) >  1. Mitral annular calcification. Tethered mitral valve  leaflets with normal opening. Moderate-severe mitral  regurgitation.  2. Eccentric left ventricular hypertrophy (dilated left  ventricle with normal relative wall thickness).  3. Endocardial visualization enhanced with intravenous  injection of echo contrast (Definity). Severe global left  ventricular systolic dysfunction.  4. Normal right ventricular size with decreased right  ventricular systolic function.  5. Estimated right ventricular systolic pressure equals 58  mm Hg, assuming right atrial pressure equals 8 mm Hg,  < end of copied text > : 64 M    h/o   MI/ stemi  2016 .  s/p    cardiogenic shock ,   CABG.  HLD.   s/p AICD placement  h/o low  EF, OF  20,   severe  MR. mod  pulm htn. in 2016  COPD.  OBESITY       here for 2 episode of syncope   in the setting of diarrhea x 2 days.      Has been having 3 episodes of profuse loose stools. No recent abx use.     Had two episodes of passing out, each time while he was standing up from a lying down   tele    orthostatic   low  sbp.  hold  aldactone/ lasix  and  coreg/  lisinopril   for now.  a s  sbp  is in 80's   on asa/ plavix/  statin/    gentle  hydration  ct head, pending    ct chest/ abdomen,    sternal  dehiscence /  emphysema/ GB  adenomyosis  gi  eval/  ID eval. 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 No pneumothorax. No pneumomediastinum.   Moderate upper lobe predominant centrilobular emphysema. No evidence of pneumonia.  Prominent gallbladder wall thickening in the body and fundus of the gallbladder up to 1 cm, which may be related to collapse, possibly adenomyomatosis; consider nonemergent contrast enhanced MRI for further evaluation as indicated.  < end of copied text >       < from: Transthoracic Echocardiogram (11.20.16 @ 18:09) >  1. Mitral annular calcification. Tethered mitral valve  leaflets with normal opening. Moderate-severe mitral  regurgitation.  2. Eccentric left ventricular hypertrophy (dilated left  ventricle with normal relative wall thickness).  3. Endocardial visualization enhanced with intravenous  injection of echo contrast (Definity). Severe global left  ventricular systolic dysfunction.  4. Normal right ventricular size with decreased right  ventricular systolic function.  5. Estimated right ventricular systolic pressure equals 58  mm Hg, assuming right atrial pressure equals 8 mm Hg,  < end of copied text > : 64 M    h/o   MI/ stemi  2016 .  s/p    cardiogenic shock ,   CABG.  HLD.   s/p AICD placement  h/o low  EF, OF  20,   severe  MR. mod  pulm htn. in 2016  COPD.  OBESITY       here for 2 episode of syncope   in the setting of diarrhea x 2 days.      Has been having 3 episodes of profuse loose stools. No recent abx use.     Had two episodes of passing out, each time while he was standing up from a lying down   tele    orthostatic   low  sbp.  hold  aldactone/ lasix  and  coreg/  lisinopril   for now.  a s  sbp  is in 80's   on asa/ plavix/  statin/    gentle  hydration  stool  c/s/  d  c  diff toxin  ct head, pending    ct chest/ abdomen,    sternal  dehiscence /  emphysema/ GB  adenomyosis  gi  eval/  ID eval. 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 No pneumothorax. No pneumomediastinum.   Moderate upper lobe predominant centrilobular emphysema. No evidence of pneumonia.  Prominent gallbladder wall thickening in the body and fundus of the gallbladder up to 1 cm, which may be related to collapse, possibly adenomyomatosis; consider nonemergent contrast enhanced MRI for further evaluation as indicated.  < end of copied text >       < from: Transthoracic Echocardiogram (11.20.16 @ 18:09) >  1. Mitral annular calcification. Tethered mitral valve  leaflets with normal opening. Moderate-severe mitral  regurgitation.  2. Eccentric left ventricular hypertrophy (dilated left  ventricle with normal relative wall thickness).  3. Endocardial visualization enhanced with intravenous  injection of echo contrast (Definity). Severe global left  ventricular systolic dysfunction.  4. Normal right ventricular size with decreased right  ventricular systolic function.  5. Estimated right ventricular systolic pressure equals 58  mm Hg, assuming right atrial pressure equals 8 mm Hg,  < end of copied text >

## 2020-03-22 NOTE — H&P ADULT - NSHPPHYSICALEXAM_GEN_ALL_CORE
PHYSICAL EXAMINATION:  Vital Signs Last 24 Hrs  T(C): 36.6 (22 Mar 2020 08:17), Max: 37.3 (22 Mar 2020 07:15)  T(F): 97.8 (22 Mar 2020 08:17), Max: 99.2 (22 Mar 2020 07:15)  HR: 89 (22 Mar 2020 08:17) (73 - 98)  BP: 82/63 (22 Mar 2020 08:17) (69/54 - 116/69)  BP(mean): 77 (22 Mar 2020 06:40) (68 - 78)  RR: 18 (22 Mar 2020 08:17) (12 - 25)  SpO2: 100% (22 Mar 2020 08:17) (91% - 100%)  CAPILLARY BLOOD GLUCOSE            GENERAL: NAD, well-groomed,  HEAD:  atraumatic, normocephalic  EYES: sclera anicteric  ENMT: mucous membranes moist  NECK: supple, No JVD  CHEST/LUNG: clear to auscultation bilaterally;    no      rales   ,   no rhonchi,   HEART: normal S1, S2  ABDOMEN: BS+, soft, ND, NT   EXTREMITIES:       edema    b/l LEs  NEURO: awake, ,     moves all extremities  SKIN: no     rash

## 2020-03-22 NOTE — H&P ADULT - HISTORY OF PRESENT ILLNESS
: 64 M   h/o   MI/  STEMI 2016 .  s/p    cardiogenic shock ,    s/p AICD placement    here for 2 episode of syncope   in the setting of diarrhea x 2 days.     Has been having 3 episodes of profuse loose stools. No recent abx use.     Has been following strict fluid control prescribed by Dr Vincent.    Had two episodes of passing out, each time while he was standing up from a lying down position. Here because he passed out second time . Both times were on his bed, no injuries. No fevers, chills.    Cards: Jhony Thomas MD  PCP: Raul Harris MD   Aspirin 81 MG TABS  Atorvastatin Calcium 80 MG Oral Tablet; Take 1 tablet daily  Carvedilol 6.25 MG Oral Tablet; Take 1 tablet twice daily  Entresto  MG Oral Tablet; TAKE 1 TABLET BY MOUTH TWICE A DAY  Ezetimibe 10 MG Oral Tablet; Take 1 tablet daily  Lasix 20 MG Oral Tablet; TAKE 1 TABLET EVERY OTHER DAY  metFORMIN HCl - 500 MG Oral Tablet; Take 1 tablet twice daily  Plavix 75 MG Oral Tablet  Spironolactone 25 MG Oral Tablet; TAKE 1 TABLET DAILY  Tudorza Pressair 400 MCG/ACT Inhalation Aerosol Powder Breath Activated; 1 puff BID as directed

## 2020-03-22 NOTE — ED ADULT NURSE NOTE - ED CARDIAC RHYTHM
Left message to call back to relay INR results.  Looks like he restated Flonase yesterday--- check INR in 1 week    Patient was instructed to continue taking their Coumadin as follows:    Anticoagulation Summary  As of 12/8/2017    INR goal:   2.0-3.0   TTR:   69.3 % (1.2 y)   Today's INR:   1.8!   Maintenance plan:   2 mg (2 mg x 1) every day   Weekly total:   14 mg   Plan last modified:   Leyda Castillo RN (11/13/2017)   Next INR check:   12/15/2017   Target end date:   Indefinite    Indications    Paroxysmal atrial fibrillation (CMS/[facility]) [I48.0]             Anticoagulation Episode Summary     INR check location:       Preferred lab:       Send INR reminders to:   [facility]    Comments:   Home # [phone] Cell # [phone]      Anticoagulation Care Providers     Provider Role Specialty Phone number    Lovely Bloom MD Referring Internal Medicine - Clinical Cardiac Electrophysiology [phone]               Patient was instructed to contact us with any unusual bleeding,bruising,any changes in medication, antibiotic use,diet or health status.  Patient was instructed to call with any questions or concerns.           regular

## 2020-03-22 NOTE — ED ADULT NURSE NOTE - NSIMPLEMENTINTERV_GEN_ALL_ED
Implemented All Fall with Harm Risk Interventions:  [place] to call system. Call bell, personal items and telephone within reach. Instruct patient to call for assistance. Room bathroom lighting operational. Non-slip footwear when patient is off stretcher. Physically safe environment: no spills, clutter or unnecessary equipment. Stretcher in lowest position, wheels locked, appropriate side rails in place. Provide visual cue, wrist band, yellow gown, etc. Monitor gait and stability. Monitor for mental status changes and reorient to person, place, and time. Review medications for side effects contributing to fall risk. Reinforce activity limits and safety measures with patient and family. Provide visual clues: red socks.

## 2020-03-22 NOTE — ED ADULT NURSE NOTE - SKIN TURGOR
Operative note  ; 68-year-old patient of Dr. Jimenez Regan status post right double-J stent placement for several ureteral stones.  Patient taken to the OR today for stone extraction.  Patient was taken to the operating room given a general anesthetic placed in lithotomy prepped and draped.  Cystoscopy was performed the urethra was patent bladder mucosa smooth.  The indwelling stent was grasped with an alligator forcep and extracted to the urethral meatus.  A sensor wire was passed through the lumen of the stent.  The semirigid ureteroscope was advanced to the stones 2 of which were noted . Stones were treated with a holmium laser fiber and the fragments grasped and removed with a basket device.A  6x28 double-J stent was placed with adequate curls left and urinary bladder and renal pelvis.  Retrieval string was not left behind with plans to keep the stent for roughly 1 week.  Patient was extubated and brought to recovery in stable condition.             .   resilient/elastic

## 2020-03-22 NOTE — ED PROVIDER NOTE - PMH
CAD (coronary artery disease)  CABG 2 vessel at [facility] may 2016  COPD (chronic obstructive pulmonary disease)    Former smoker    HLD (hyperlipidemia)    MI (myocardial infarction)  May 2016  ATA (obstructive sleep apnea)

## 2020-03-22 NOTE — H&P ADULT - NSHPLABSRESULTS_GEN_ALL_CORE
LABS:                        15.4   11.97 )-----------( 138      ( 22 Mar 2020 04:22 )             46.1         132<L>  |  97  |  40<H>  ----------------------------<  131<H>  4.8   |  18<L>  |  1.60<H>    Ca    8.3<L>      22 Mar 2020 04:22    TPro  6.7  /  Alb  3.6  /  TBili  1.0  /  DBili  x   /  AST  36  /  ALT  39  /  AlkPhos  31<L>      PT/INR - ( 22 Mar 2020 04:22 )   PT: 13.3 sec;   INR: 1.16 ratio         PTT - ( 22 Mar 2020 04:22 )  PTT:34.5 sec      Urinalysis Basic - ( 22 Mar 2020 08:12 )    Color: Yellow / Appearance: Clear / S.041 / pH: x  Gluc: x / Ketone: Negative  / Bili: Negative / Urobili: Negative   Blood: x / Protein: Trace / Nitrite: Negative   Leuk Esterase: Negative / RBC: 1 /hpf / WBC 1 /HPF   Sq Epi: x / Non Sq Epi: 1 /hpf / Bacteria: Negative           @ 07:10  4.1  27

## 2020-03-22 NOTE — CONSULT NOTE ADULT - ASSESSMENT
diarrhea  cad  s/p aicd    etiology of diarrhea unclear  c diff pending  will order gi pcr  diet as tolerated  ct reviewed, slightly thickening gb possible adenomyomatosis  unable to get mri 2/2 aicd  monitor with u/s every 3-6 months  no objection to cont dual antiplatlet therapy  hold off on antibiotics

## 2020-03-22 NOTE — ED PROVIDER NOTE - OBJECTIVE STATEMENT
Anuj SHETH MD PGY2: 64 M STEMI 2016 c/b cardiogenic shock now  s/p AICD placement here for 2 episode of syuncope in the setting of diarrhea x 2 days. Has been having 3 episodes of profuse loose stools. No recent abx use. Has been following strict fluid control prescribed by Dr Vincent. Had two episodes of passing out, each time while he was standing up from a lying down position. Here because he passed out second time. Both times were on his bed, no injuries. No fevers, chills.     Cards: Jhony Thomas MD  PCP: Raul Harris MD    Aspirin 81 MG TABS  Atorvastatin Calcium 80 MG Oral Tablet; Take 1 tablet daily  Carvedilol 6.25 MG Oral Tablet; Take 1 tablet twice daily  Entresto  MG Oral Tablet; TAKE 1 TABLET BY MOUTH TWICE A DAY  Ezetimibe 10 MG Oral Tablet; Take 1 tablet daily  Lasix 20 MG Oral Tablet; TAKE 1 TABLET EVERY OTHER DAY  metFORMIN HCl - 500 MG Oral Tablet; Take 1 tablet twice daily  Plavix 75 MG Oral Tablet  Spironolactone 25 MG Oral Tablet; TAKE 1 TABLET DAILY  Tudorza Pressair 400 MCG/ACT Inhalation Aerosol Powder Breath Activated; 1 puff BID  as directed

## 2020-03-22 NOTE — ED ADULT NURSE REASSESSMENT NOTE - NS ED NURSE REASSESS COMMENT FT1
Report received from Ruth Shell RN. Pt AAOx4, NAD, resp nonlabored, skin warm/dry, resting comfortably in bed. Pt denies headache, dizziness, chest pain, palpitations, SOB, abd pain, n/v/d, fevers, chills, weakness at this time. Pt awaiting bed. Pt remains on CCM with NSR. Safety maintained. Report received from Ruth Shell RN. Pt AAOx4, NAD, resp nonlabored, skin warm/dry, resting comfortably in bed. Pt denies headache, dizziness, chest pain, palpitations, SOB, abd pain, n/v/d, fevers, chills, weakness at this time. Pt awaiting bed. Pt remains on CCM with NSR. Safety maintained.  Wife (Yadira) [phone] and Son (Austin) [phone] want phone numbers recorded in pt chart.  wants family provided with updates when necessary.

## 2020-03-22 NOTE — ED ADULT NURSE NOTE - PMH
CAD (coronary artery disease)  CABG 2 vessel at [facility] may 2016  COPD (chronic obstructive pulmonary disease)    Former smoker    HLD (hyperlipidemia)    MI (myocardial infarction)  May 2016  ATA (obstructive sleep apnea)

## 2020-03-22 NOTE — H&P ADULT - NSICDXPASTMEDICALHX_GEN_ALL_CORE_FT
PAST MEDICAL HISTORY:  CAD (coronary artery disease) CABG 2 vessel at [facility] may 2016    COPD (chronic obstructive pulmonary disease)     Former smoker     HLD (hyperlipidemia)     MI (myocardial infarction) May 2016    ATA (obstructive sleep apnea)

## 2020-03-22 NOTE — ED PROVIDER NOTE - CLINICAL SUMMARY MEDICAL DECISION MAKING FREE TEXT BOX
Anuj SHETH MD PGY2: Patient here hypotensive but mentating well and with unremarkable physical exam. WIll obtain sepsis labs, barring urinalysis and cultures as patient not febrile nor tachy. WIll obtain EKG and cardiac labs. WIll fluid ressucitate. Anuj SHETH MD PGY2: Patient here hypotensive but mentating well and with unremarkable physical exam. WIll obtain sepsis labs, barring urinalysis and cultures as patient not febrile nor tachy. WIll obtain EKG and cardiac labs. WIll fluid ressucitate.    ALDEN Lawson MD: Pt is a 63 y/o male with PMH STEMI 2016 c/b cardiogenic shock s/p AICD placement p/w 2 episodes of syncope in the setting of diarrhea x 2 days. Has been having 3 episodes of profuse loose stools. No recent abx use. Had two episodes of passing out, each time while he was standing up from a lying down position. Here because he passed out second time. Both times were on his bed, no injuries. No fevers, chills. Pt denies: chest pain, SOB, cough, fevers, chills, pleuritic chest pain, abdominal pain, n/v, bloody stools, back pain, neck pain, HA, neck stiffness, focal numbness or weakness, visual changes, leg pain/swelling, recent travel, recent trauma, recent immobilization, dysuria, hematuria, rash. Ddx includes, however, is not limited to: hypovolemia, vasovagal, orthostatic, cardiogenic, vascular d/o, GI d/o other. Plan: basic labs, cardiac w/u, IVF, CTAP, reassess

## 2020-03-22 NOTE — ED ADULT NURSE REASSESSMENT NOTE - NS ED NURSE REASSESS COMMENT FT1
Pt had episode of SpO2 at 88% with good waveform.  Pt sat up, repositioned and SpO2 went up to 95%.  Pt denies c/o SOB.  Lungs clear bilaterally.  Safety and comfort maintained. Will continue to monitor.

## 2020-03-22 NOTE — CHART NOTE - NSCHARTNOTEFT_GEN_A_CORE
Notified by RN of pt's BP 82/58, HR 84. Spoke with Dr. Beckham, plan to give 250cc FB x 60mins. Dr. Suh consulted.     Aris Phipps, WILLIS  [phone]

## 2020-03-22 NOTE — CONSULT NOTE ADULT - SUBJECTIVE AND OBJECTIVE BOX
[facility]  Suman Vital PA-C  [address] [place] [address]  [phone]      Chief Complaint:  Patient is a 64y old  Male who presents with a chief complaint of syncope (22 Mar 2020 10:40)      HPI: 64 M STEMI 2016 c/b cardiogenic shock now  s/p AICD placement here for 2 episode of syuncope in the setting of diarrhea x 2 days. Has been having 3 episodes of profuse loose stools. No recent abx use. Has been following strict fluid control prescribed by Dr Vincent. Had two episodes of passing out, each time while he was standing up from a lying down position. Here because he passed out second time. Both times were on his bed, no injuries. No fevers, chills.       Allergies:  No Known Allergies      Medications:  aspirin enteric coated 81 milliGRAM(s) Oral daily  atorvastatin 80 milliGRAM(s) Oral at bedtime  clopidogrel Tablet 75 milliGRAM(s) Oral daily  heparin  Injectable 5000 Unit(s) SubCutaneous every 12 hours  sodium chloride 0.9%. 1000 milliLiter(s) IV Continuous <Continuous>      PMHX/PSHX:  COPD (chronic obstructive pulmonary disease)  Former smoker  CAD (coronary artery disease)  HLD (hyperlipidemia)  ATA (obstructive sleep apnea)  MI (myocardial infarction)  S/P CABG x 2      Family history:  No pertinent family history in first degree relatives      Social History:     ROS:     General:  No wt loss, fevers, chills, night sweats, fatigue,   Eyes:  Good vision, no reported pain  ENT:  No sore throat, pain, runny nose, dysphagia  CV:  No pain, palpitations, hypo/hypertension  Resp:  No dyspnea, cough, tachypnea, wheezing  GI:  No pain, No nausea, No vomiting, + diarrhea, No constipation, No weight loss, No fever, No pruritis, No rectal bleeding, No tarry stools, No dysphagia,  :  No pain, bleeding, incontinence, nocturia  Muscle:  No pain, weakness  Neuro:  No weakness, tingling, memory problems  Psych:  No fatigue, insomnia, mood problems, depression  Endocrine:  No polyuria, polydipsia, cold/heat intolerance  Heme:  No petechiae, ecchymosis, easy bruisability  Skin:  No rash, tattoos, scars, edema      PHYSICAL EXAM:   Vital Signs:  Vital Signs Last 24 Hrs  T(C): 37.3 (22 Mar 2020 12:48), Max: 37.3 (22 Mar 2020 07:15)  T(F): 99.1 (22 Mar 2020 12:48), Max: 99.2 (22 Mar 2020 07:15)  HR: 80 (22 Mar 2020 12:48) (73 - 98)  BP: 102/68 (22 Mar 2020 12:48) (69/54 - 116/69)  BP(mean): 77 (22 Mar 2020 06:40) (68 - 78)  RR: 17 (22 Mar 2020 12:48) (12 - 25)  SpO2: 96% (22 Mar 2020 12:48) (91% - 100%)  Daily     Daily     GENERAL:  Appears stated age, well-groomed, well-nourished, no distress  HEENT:  NC/AT,  conjunctivae clear and pink, no thyromegaly, nodules, adenopathy, no JVD, sclera -anicteric  CHEST:  Full & symmetric excursion, no increased effort, breath sounds clear  HEART:  Regular rhythm, S1, S2, no murmur/rub/S3/S4, no abdominal bruit, no edema  ABDOMEN:  Soft, non-tender, non-distended, normoactive bowel sounds,  no masses ,no hepato-splenomegaly, no signs of chronic liver disease  EXTEREMITIES:  no cyanosis,clubbing or edema  SKIN:  No rash/erythema/ecchymoses/petechiae/wounds/abscess/warm/dry  NEURO:  Alert, oriented, no asterixis, no tremor, no encephalopathy    LABS:                        15.4   11.97 )-----------( 138      ( 22 Mar 2020 04:22 )             46.1         132<L>  |  97  |  40<H>  ----------------------------<  131<H>  4.8   |  18<L>  |  1.60<H>    Ca    8.3<L>      22 Mar 2020 04:22    TPro  6.7  /  Alb  3.6  /  TBili  1.0  /  DBili  x   /  AST  36  /  ALT  39  /  AlkPhos  31<L>  03-22    LIVER FUNCTIONS - ( 22 Mar 2020 04:22 )  Alb: 3.6 g/dL / Pro: 6.7 g/dL / ALK PHOS: 31 U/L / ALT: 39 U/L / AST: 36 U/L / GGT: x           PT/INR - ( 22 Mar 2020 04:22 )   PT: 13.3 sec;   INR: 1.16 ratio         PTT - ( 22 Mar 2020 04:22 )  PTT:34.5 sec  Urinalysis Basic - ( 22 Mar 2020 08:12 )    Color: Yellow / Appearance: Clear / S.041 / pH: x  Gluc: x / Ketone: Negative  / Bili: Negative / Urobili: Negative   Blood: x / Protein: Trace / Nitrite: Negative   Leuk Esterase: Negative / RBC: 1 /hpf / WBC 1 /HPF   Sq Epi: x / Non Sq Epi: 1 /hpf / Bacteria: Negative          Imaging:

## 2020-03-22 NOTE — ED PROVIDER NOTE - PHYSICAL EXAMINATION
Anuj SHETH MD PGY2:   PHYSICAL EXAM:    GENERAL: NAD, well-developed  HEENT:  Atraumatic, Normocephalic  CHEST/LUNG: CTAB.   HEART: Regular rate and rhythm  ABDOMEN: Soft, Nontender, Nondistended  EXTREMITIES:  2+ Peripheral Pulses.  PSYCH: A&Ox3  SKIN: No obvious rashes or lesions

## 2020-03-23 LAB
ANION GAP SERPL CALC-SCNC: 14 MMOL/L — SIGNIFICANT CHANGE UP (ref 5–17)
BUN SERPL-MCNC: 21 MG/DL — SIGNIFICANT CHANGE UP (ref 7–23)
CALCIUM SERPL-MCNC: 8.6 MG/DL — SIGNIFICANT CHANGE UP (ref 8.4–10.5)
CHLORIDE SERPL-SCNC: 98 MMOL/L — SIGNIFICANT CHANGE UP (ref 96–108)
CO2 SERPL-SCNC: 20 MMOL/L — LOW (ref 22–31)
CREAT SERPL-MCNC: 1.14 MG/DL — SIGNIFICANT CHANGE UP (ref 0.5–1.3)
CULTURE RESULTS: NO GROWTH — SIGNIFICANT CHANGE UP
GLUCOSE SERPL-MCNC: 119 MG/DL — HIGH (ref 70–99)
HCT VFR BLD CALC: 51.6 % — HIGH (ref 39–50)
HCV AB S/CO SERPL IA: 0.24 S/CO — SIGNIFICANT CHANGE UP (ref 0–0.99)
HCV AB SERPL-IMP: SIGNIFICANT CHANGE UP
HGB BLD-MCNC: 16.5 G/DL — SIGNIFICANT CHANGE UP (ref 13–17)
LACTATE SERPL-SCNC: 2.6 MMOL/L — HIGH (ref 0.7–2)
MCHC RBC-ENTMCNC: 29 PG — SIGNIFICANT CHANGE UP (ref 27–34)
MCHC RBC-ENTMCNC: 32 GM/DL — SIGNIFICANT CHANGE UP (ref 32–36)
MCV RBC AUTO: 90.7 FL — SIGNIFICANT CHANGE UP (ref 80–100)
NRBC # BLD: 0 /100 WBCS — SIGNIFICANT CHANGE UP (ref 0–0)
PLATELET # BLD AUTO: 147 K/UL — LOW (ref 150–400)
POTASSIUM SERPL-MCNC: 4.5 MMOL/L — SIGNIFICANT CHANGE UP (ref 3.5–5.3)
POTASSIUM SERPL-SCNC: 4.5 MMOL/L — SIGNIFICANT CHANGE UP (ref 3.5–5.3)
PROCALCITONIN SERPL-MCNC: 0.12 NG/ML — HIGH (ref 0.02–0.1)
RBC # BLD: 5.69 M/UL — SIGNIFICANT CHANGE UP (ref 4.2–5.8)
RBC # FLD: 16.1 % — HIGH (ref 10.3–14.5)
SODIUM SERPL-SCNC: 132 MMOL/L — LOW (ref 135–145)
SPECIMEN SOURCE: SIGNIFICANT CHANGE UP
WBC # BLD: 6.99 K/UL — SIGNIFICANT CHANGE UP (ref 3.8–10.5)
WBC # FLD AUTO: 6.99 K/UL — SIGNIFICANT CHANGE UP (ref 3.8–10.5)

## 2020-03-23 PROCEDURE — 99232 SBSQ HOSP IP/OBS MODERATE 35: CPT

## 2020-03-23 RX ORDER — SODIUM CHLORIDE 9 MG/ML
500 INJECTION INTRAMUSCULAR; INTRAVENOUS; SUBCUTANEOUS ONCE
Refills: 0 | Status: COMPLETED | OUTPATIENT
Start: 2020-03-23 | End: 2020-03-23

## 2020-03-23 RX ORDER — FUROSEMIDE 40 MG
2 TABLET ORAL
Qty: 0 | Refills: 0 | DISCHARGE

## 2020-03-23 RX ORDER — VANCOMYCIN HCL 1 G
125 VIAL (EA) INTRAVENOUS EVERY 6 HOURS
Refills: 0 | Status: DISCONTINUED | OUTPATIENT
Start: 2020-03-23 | End: 2020-03-26

## 2020-03-23 RX ORDER — SODIUM CHLORIDE 9 MG/ML
1000 INJECTION INTRAMUSCULAR; INTRAVENOUS; SUBCUTANEOUS
Refills: 0 | Status: DISCONTINUED | OUTPATIENT
Start: 2020-03-23 | End: 2020-03-24

## 2020-03-23 RX ORDER — LACTOBACILLUS ACIDOPHILUS 100MM CELL
1 CAPSULE ORAL THREE TIMES A DAY
Refills: 0 | Status: DISCONTINUED | OUTPATIENT
Start: 2020-03-23 | End: 2020-03-25

## 2020-03-23 RX ORDER — EZETIMIBE 10 MG/1
1 TABLET ORAL
Qty: 0 | Refills: 0 | DISCHARGE

## 2020-03-23 RX ORDER — MIDODRINE HYDROCHLORIDE 2.5 MG/1
2.5 TABLET ORAL THREE TIMES A DAY
Refills: 0 | Status: DISCONTINUED | OUTPATIENT
Start: 2020-03-23 | End: 2020-03-26

## 2020-03-23 RX ADMIN — Medication 125 MILLIGRAM(S): at 23:54

## 2020-03-23 RX ADMIN — CLOPIDOGREL BISULFATE 75 MILLIGRAM(S): 75 TABLET, FILM COATED ORAL at 12:32

## 2020-03-23 RX ADMIN — Medication 81 MILLIGRAM(S): at 12:32

## 2020-03-23 RX ADMIN — SODIUM CHLORIDE 1000 MILLILITER(S): 9 INJECTION INTRAMUSCULAR; INTRAVENOUS; SUBCUTANEOUS at 09:14

## 2020-03-23 RX ADMIN — SODIUM CHLORIDE 70 MILLILITER(S): 9 INJECTION INTRAMUSCULAR; INTRAVENOUS; SUBCUTANEOUS at 09:49

## 2020-03-23 RX ADMIN — Medication 125 MILLIGRAM(S): at 12:30

## 2020-03-23 RX ADMIN — Medication 125 MILLIGRAM(S): at 06:08

## 2020-03-23 RX ADMIN — Medication 125 MILLIGRAM(S): at 17:22

## 2020-03-23 RX ADMIN — Medication 1 TABLET(S): at 21:06

## 2020-03-23 RX ADMIN — Medication 1 TABLET(S): at 16:01

## 2020-03-23 RX ADMIN — ATORVASTATIN CALCIUM 80 MILLIGRAM(S): 80 TABLET, FILM COATED ORAL at 21:06

## 2020-03-23 RX ADMIN — MIDODRINE HYDROCHLORIDE 2.5 MILLIGRAM(S): 2.5 TABLET ORAL at 16:01

## 2020-03-23 RX ADMIN — HEPARIN SODIUM 5000 UNIT(S): 5000 INJECTION INTRAVENOUS; SUBCUTANEOUS at 17:26

## 2020-03-23 RX ADMIN — SODIUM CHLORIDE 50 MILLILITER(S): 9 INJECTION INTRAMUSCULAR; INTRAVENOUS; SUBCUTANEOUS at 08:34

## 2020-03-23 RX ADMIN — SODIUM CHLORIDE 70 MILLILITER(S): 9 INJECTION INTRAMUSCULAR; INTRAVENOUS; SUBCUTANEOUS at 21:07

## 2020-03-23 RX ADMIN — HEPARIN SODIUM 5000 UNIT(S): 5000 INJECTION INTRAVENOUS; SUBCUTANEOUS at 06:08

## 2020-03-23 NOTE — CHART NOTE - NSCHARTNOTEFT_GEN_A_CORE
Clostridium difficile Toxin by PCR (03.22.20 @ 12:25)    Clostridium difficile Toxin by PCR: The results of this test should be interpreted with consideration of all  clinical and laboratory findings. This test determines the presence of  the C. difficile tcdB gene at a given time and is not intended to  identify antibiotic associated disease or C. difficile infection without  clinical context.  Successful treatment is based on the resolution of clinical symptoms.  This test should not be used as a "test of cure" because C. difficile DNA  will persist after successful treatment. Repeat testing will not be  permitted.    This test is performed on the BD MAX system using Real-Time PCR and  fluorogenic target-specific hybridization.    C Diff by PCR Result: Detected    Pt with diarrhea. Cdiff positive. PO vancomycin 125 mg q 6 initiated. VSS. HD stable. F/u with GI in AM. Will continue to monitor.     Jody RIVERA  #[id number]

## 2020-03-23 NOTE — CHART NOTE - NSCHARTNOTEFT_GEN_A_CORE
Medicine NP Episodic Note     Called by RN to evaluate pt. w/ temp of 101.9F (38.8C).  Pt. seen and examined at bedside, in NAD.  Admits to having diarrhea.  Denies c/o CP, SOB, palpitation, diaphoresis, chills, N/V, abd pain or dysuria.    VITAL SIGNS:  T(C): 36.7 (03-23-20 @ 21:38), Max: 38.8 (03-23-20 @ 20:54)  HR: 93 (03-23-20 @ 20:54) (89 - 93)  BP: 100/59 (03-23-20 @ 20:54) (98/62 - 107/68)  RR: 18 (03-23-20 @ 20:54) (18 - 18)  SpO2: 91% (03-23-20 @ 20:54) (91% - 95%)  Wt(kg): --      LABORATORY:                        16.5   6.99  )-----------( 147      ( 23 Mar 2020 10:10 )             51.6       03-23    132<L>  |  98  |  21  ----------------------------<  119<H>  4.5   |  20<L>  |  1.14    Ca    8.6      23 Mar 2020 10:10    TPro  6.7  /  Alb  3.6  /  TBili  1.0  /  DBili  x   /  AST  36  /  ALT  39  /  AlkPhos  31<L>  03-22        MICROBIOLOGY:   Culture - Stool (03.22.20 @ 18:23)    Specimen Source: .Stool Feces    Culture Results:   No enteric pathogens to date: Final culture pending      Culture - Urine (03.22.20 @ 12:31)    Specimen Source: .Urine Clean Catch (Midstream)    Culture Results:   No growth      Clostridium difficile Toxin by PCR (03.22.20 @ 12:25)    Clostridium difficile Toxin by PCR: The results of this test should be interpreted with consideration of all  clinical and laboratory findings. This test determines the presence of  the C. difficile tcdB gene at a given time and is not intended to  identify antibiotic associated disease or C. difficile infection without  clinical context.  Successful treatment is based on the resolution of clinical symptoms.  This test should not be used as a "test of cure" because C. difficile DNA  will persist after successful treatment. Repeat testing will not be  permitted.    This test is performed on the BD MAX system using Real-Time PCR and  fluorogenic target-specific hybridization.    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 No pneumothorax. No pneumomediastinum.     Moderate upper lobe predominant centrilobular emphysema. No evidence of pneumonia.    Prominent gallbladder wall thickening in the body and fundus of the gallbladder up to 1 cm, which may be related to collapse, possibly adenomyomatosis; consider nonemergent contrast enhanced MRI for further evaluation as indicated.  < end of copied text >      < from: Xray Chest 1 View AP/PA (03.22.20 @ 05:26) >  IMPRESSION:   Clear lungs.  Dehiscence of multiple sternal wires, new since 11/21/2016. Follow-up already ordered chest CT to further evaluate.  < end of copied text >       PHYSICAL EXAM:  Constitutional: AOx3, in NAD  Resp: CTAB, resp. even and non-labored bilat  CV: S1S2, RRR  GI: Hyperactive bowel sounds x 4 quads, ND/NT  Ext: +2 pulses bilaterally, no BLE edema      ASSESSMENT/PLAN:   HPI:  64M w/ PMHx of MI/STEMI 2016, s/p cardiogenic shock , s/p AICD placement here for 2 episode of syncope in the setting of diarrhea x 2 days.  Found to be CDiff positive.  Now febrile, temp. 101.9F (38.8C).    # Fever likely in setting of GI infection   > Tylenol and cooling measures PRN for pyrexia  > BC x 2, procalcitonin, lactate   > CBC diff in am   > c/w PO Vanco    > Maintain contact precautions  > Monitor nesha signs   > F/u am labs   > ID following      Will endorse to primary team in am; attending to follow.    Lois Dalton, [facility]  [phone] Medicine NP Episodic Note     Called by RN to evaluate pt. w/ temp of 101.9F (38.8C).  Pt. seen and examined at bedside, in NAD.  Admits to having diarrhea.  Denies c/o CP, SOB, palpitation, diaphoresis, chills, N/V, abd pain or dysuria.    VITAL SIGNS:  T(C): 36.7 (03-23-20 @ 21:38), Max: 38.8 (03-23-20 @ 20:54)  HR: 93 (03-23-20 @ 20:54) (89 - 93)  BP: 100/59 (03-23-20 @ 20:54) (98/62 - 107/68)  RR: 18 (03-23-20 @ 20:54) (18 - 18)  SpO2: 91% (03-23-20 @ 20:54) (91% - 95%)  Wt(kg): --      LABORATORY:                        16.5   6.99  )-----------( 147      ( 23 Mar 2020 10:10 )             51.6       03-23    132<L>  |  98  |  21  ----------------------------<  119<H>  4.5   |  20<L>  |  1.14    Ca    8.6      23 Mar 2020 10:10    TPro  6.7  /  Alb  3.6  /  TBili  1.0  /  DBili  x   /  AST  36  /  ALT  39  /  AlkPhos  31<L>  03-22        MICROBIOLOGY:   Culture - Stool (03.22.20 @ 18:23)    Specimen Source: .Stool Feces    Culture Results:   No enteric pathogens to date: Final culture pending      Culture - Urine (03.22.20 @ 12:31)    Specimen Source: .Urine Clean Catch (Midstream)    Culture Results:   No growth      Clostridium difficile Toxin by PCR (03.22.20 @ 12:25)    Clostridium difficile Toxin by PCR: The results of this test should be interpreted with consideration of all  clinical and laboratory findings. This test determines the presence of  the C. difficile tcdB gene at a given time and is not intended to  identify antibiotic associated disease or C. difficile infection without  clinical context.  Successful treatment is based on the resolution of clinical symptoms.  This test should not be used as a "test of cure" because C. difficile DNA  will persist after successful treatment. Repeat testing will not be  permitted.    This test is performed on the BD MAX system using Real-Time PCR and  fluorogenic target-specific hybridization.    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 No pneumothorax. No pneumomediastinum.     Moderate upper lobe predominant centrilobular emphysema. No evidence of pneumonia.    Prominent gallbladder wall thickening in the body and fundus of the gallbladder up to 1 cm, which may be related to collapse, possibly adenomyomatosis; consider nonemergent contrast enhanced MRI for further evaluation as indicated.  < end of copied text >      < from: Xray Chest 1 View AP/PA (03.22.20 @ 05:26) >  IMPRESSION:   Clear lungs.  Dehiscence of multiple sternal wires, new since 11/21/2016. Follow-up already ordered chest CT to further evaluate.  < end of copied text >       PHYSICAL EXAM:  Constitutional: AOx3, in NAD  Resp: CTAB, resp. even and non-labored bilat  CV: S1S2, RRR  GI: Hyperactive bowel sounds x 4 quads, ND/NT  Ext: +2 pulses bilaterally, no BLE edema      ASSESSMENT/PLAN:   HPI:  64M w/ PMHx of MI/STEMI 2016, s/p cardiogenic shock , s/p AICD placement here for 2 episode of syncope in the setting of diarrhea x 2 days.  Found to be CDiff positive.  Now febrile, temp. 101.9F (38.8C).    # Fever likely in setting of GI infection   > Tylenol and cooling measures PRN for pyrexia  > BC x 2, procalcitonin, lactate   > CBC diff in am   > c/w PO Vanco    > Continue IV hydration   > Maintain contact precautions  > Monitor nesha signs   > F/u am labs   > ID following      Will endorse to primary team in am; attending to follow.    Lois Dalton, [employer]-BC  [phone]

## 2020-03-23 NOTE — PROGRESS NOTE ADULT - ASSESSMENT
diarrhea  cad  s/p aicd    C.diff +  ID eval noted  cont vanco 125mg po q6  start Bacid TID   diet as tolerated  supportive care  monitor electrolytes, replete prn   monitor stool output   ct reviewed, slightly thickening gb possible adenomyomatosis  unable to get mri 2/2 aicd  monitor with u/s every 3-6 months  no objection to cont dual antiplatlet therapy

## 2020-03-23 NOTE — PROGRESS NOTE ADULT - ASSESSMENT
: 64 M    h/o   MI/ stemi  2016 .  s/p    cardiogenic shock ,   CABG.  HLD.   s/p AICD placement  h/o low  EF, OF  20,   severe  MR. mod  pulm htn. in 2016  COPD., obesity       here for 2 episode of syncope   in the setting of diarrhea x 2 days.      Has been having 3 episodes of profuse loose stools. No recent abx use.     Had two episodes of passing out, each time while he was standing up from a lying down   tele.  nsr/  ct head, no bleed/ ?  c/c  hygroma vs subdural  hematomas    orthostatic   low  sbp.  hold  aldactone/ lasix  and  coreg/  lisinopril   for now.    given low   sbp    on asa/ plavix/  statin/    gentle  hydration    c  diff toxin. positive/ on po  vanco/       ct chest/ abdomen,    sternal  dehiscence /  emphysema/ GB  adenomyosis  gi  eval/  ID eval. dr Gallegos/  imtiaz  d r fuschetto   dvt ppx     < fom: CT Head No Cont (03.22.20 @ 13:01) >  IMPRESSION:    Left greater than right small bifrontal CSF extra-axial density which may represent prominent subarachnoid spaces, subdural hygromas or chronic subdural hematomas.    Otherwise unremarkable exam.  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 No pneumothorax. No pneumomediastinum.   Moderate upper lobe predominant centrilobular emphysema. No evidence of pneumonia.  Prominent gallbladder wall thickening in the body and fundus of the gallbladder up to 1 cm, which may be related to collapse, possibly adenomyomatosis; consider nonemergent contrast enhanced MRI for further evaluation as indicated.  < end of copied text >       < from: Transthoracic Echocardiogram (11.20.16 @ 18:09) >  1. Mitral annular calcification. Tethered mitral valve  leaflets with normal opening. Moderate-severe mitral  regurgitation.  2. Eccentric left ventricular hypertrophy (dilated left  ventricle with normal relative wall thickness).  3. Endocardial visualization enhanced with intravenous  injection of echo contrast (Definity). Severe global left  ventricular systolic dysfunction.  4. Normal right ventricular size with decreased right  ventricular systolic function.  5. Estimated right ventricular systolic pressure equals 58  mm Hg, assuming right atrial pressure equals 8 mm Hg,  < end of copied text >

## 2020-03-23 NOTE — CONSULT NOTE ADULT - SUBJECTIVE AND OBJECTIVE BOX
CARDIOLOGY CONSULT - Dr. Suh         HPI:  64 M   h/o  CAD, S/P CABG 2016, MI/  STEMI 2016, s/p  cardiogenic shock , s/p AICD, mod-sev MR, Systolic CHF,  presenting with here for 2 episode of syncope in the setting of diarrhea x 2 days.  He reports profuse loose stools. No recent abx use. Decrease PO intake.  Had two episodes of passing out, each time while he was standing up from a lying down position. Both times were on his bed, no injuries. No fevers, chills. He otherwise denies any cp, sob, palps. orthopnea or le edema.   echo 11/20/16 EF 20%, sev global LV dysfx. mod pulm htn, mod to severe MR   Cath 11/18/16 with patent grafts     PAST MEDICAL & SURGICAL HISTORY:  COPD (chronic obstructive pulmonary disease)  Former smoker  CAD (coronary artery disease): CABG 2 vessel at [facility] may 2016  HLD (hyperlipidemia)  ATA (obstructive sleep apnea)  MI (myocardial infarction): May 2016  S/P CABG x 2          PREVIOUS DIAGNOSTIC TESTING:    [ ] Echocardiogram:    < from: Transthoracic Echocardiogram (11.20.16 @ 18:09) >  EF (Corley Rule): 20 %  ------------------------------------------------------------------------  Observations:  Mitral Valve: Mitral annular calcification. Tethered mitral  valve leaflets with normal opening. Moderate-severe mitral  regurgitation.  Aortic Valve/Aorta: Calcified trileaflet aortic valvewith  normal opening.  Aortic Root: 2.8 cm.  Left Atrium: Severely dilated left atrium.  LA volume index  = 54 cc/m2.  Left Ventricle: Endocardial visualization enhanced with  intravenous injection of echo contrast (Definity). Severe  global left ventricular systolic dysfunction. Eccentric  left ventricular hypertrophy (dilated left ventricle with  normal relative wall thickness).  Right Heart: Right atrial enlargement. Normal right  ventricular size with decreased right ventricular systolic  function. Normal tricuspid valve. Mild-moderate tricuspid  regurgitation. Normal pulmonic valve.  Pericardium/Pleura: Normal pericardium with no pericardial  effusion.  Hemodynamic: Estimated right atrial pressure is 8 mm Hg.  Estimated right ventricularsystolic pressure equals 58 mm  Hg, assuming right atrial pressure equals 8 mm Hg,  consistent with moderate pulmonary hypertension.  ------------------------------------------------------------------------  Conclusions:  1. Mitral annular calcification. Tethered mitral valve  leaflets with normal opening. Moderate-severe mitral  regurgitation.  2. Eccentric left ventricular hypertrophy (dilated left  ventricle with normal relative wall thickness).  3. Endocardial visualization enhanced with intravenous  injection of echo contrast (Definity). Severe global left  ventricular systolic dysfunction.  4. Normal right ventricular size with decreased right  ventricular systolic function.  5. Estimated right ventricular systolic pressure equals 58  mm Hg, assuming right atrial pressure equals 8 mm Hg,  consistent with moderate pulmonary hypertension.  *** No previous Echo exam.  ------------------------------------------------------------------------  Confirmed on  11/20/2016 - 16:44:11 by Joellen Johnson M.D.  ------------------------------------------------------------------------      [ ]  Catheterization:    < from: Cardiac Cath Lab - Adult (11.18.16 @ 14:32) >  VENTRICLES: EF estimated was 20 %.  CORONARY VESSELS: The coronary circulation is right dominant.  LM:   --  Ostial LM: There was a 100 % stenosis.  RCA:   --  RCA: Angiography showed minor luminal irregularities with no  flow limiting lesions.  GRAFTS:   --  Graft to the mid LAD: The graft was a LIMA. Graft angiography  showed minor luminal irregularities.  --  Graft to the ramus intermedius: The graft was a saphenous vein graft  from the aorta. Graft angiography showed minor luminal irregularities.  COMPLICATIONS: There were no complications.  DIAGNOSTIC RECOMMENDATIONS: The patient should continue with the present  medications.  Prepared and signed by  Ar Virk M.D.    [ ] Stress Test:  	    MEDICATIONS:  Home Medications:  aspirin 81 mg oral tablet: 1 tab(s) orally once a day (23 Mar 2020 11:54)  carvedilol 6.25 mg oral tablet: 1 tab(s) orally 2 times a day (23 Mar 2020 11:54)  clopidogrel 75 mg oral tablet: 1 tab(s) orally once a day (23 Mar 2020 11:54)  Entresto 97 mg-103 mg oral tablet: 1 tab(s) orally 2 times a day (23 Mar 2020 11:54)  furosemide 20 mg oral tablet: 2 tabs (=40 mg) orally once a day (23 Mar 2020 11:54)  Zetia 10 mg oral tablet: 1 tab(s) orally once a day (23 Mar 2020 11:54)      MEDICATIONS  (STANDING):  aspirin enteric coated 81 milliGRAM(s) Oral daily  atorvastatin 80 milliGRAM(s) Oral at bedtime  clopidogrel Tablet 75 milliGRAM(s) Oral daily  heparin  Injectable 5000 Unit(s) SubCutaneous every 12 hours  lactobacillus acidophilus 1 Tablet(s) Oral three times a day  midodrine. 2.5 milliGRAM(s) Oral three times a day  sodium chloride 0.9%. 1000 milliLiter(s) (70 mL/Hr) IV Continuous <Continuous>  vancomycin    Solution 125 milliGRAM(s) Oral every 6 hours      FAMILY HISTORY:  No pertinent family history in first degree relatives      SOCIAL HISTORY:    [x ] former smoker     Allergies    No Known Allergies    Intolerances    	    REVIEW OF SYSTEMS:  CONSTITUTIONAL: No fever, weight loss, or fatigue  EYES: No eye pain, visual disturbances, or discharge  ENMT:  No difficulty hearing, tinnitus, vertigo; No sinus or throat pain  NECK: No pain or stiffness  RESPIRATORY: No cough, wheezing, chills or hemoptysis; No Shortness of Breath  CARDIOVASCULAR: see hpi   GASTROINTESTINAL: see hpi  GENITOURINARY: No dysuria, frequency, hematuria, or incontinence  NEUROLOGICAL: No headaches, memory loss, loss of strength, numbness, or tremors  SKIN: No itching, burning, rashes, or lesions   	    [x ] All others negative	  [ ] Unable to obtain    PHYSICAL EXAM:  T(C): 37.3 (03-23-20 @ 04:51), Max: 38.1 (03-22-20 @ 20:34)  HR: 93 (03-23-20 @ 04:51) (80 - 93)  BP: 98/62 (03-23-20 @ 04:51) (98/62 - 107/68)  RR: 18 (03-23-20 @ 04:51) (17 - 18)  SpO2: 91% (03-23-20 @ 04:51) (91% - 96%)  Wt(kg): --  I&O's Summary    22 Mar 2020 07:01  -  23 Mar 2020 07:00  --------------------------------------------------------  IN: 600 mL / OUT: 0 mL / NET: 600 mL    23 Mar 2020 07:01  -  23 Mar 2020 12:42  --------------------------------------------------------  IN: 120 mL / OUT: 0 mL / NET: 120 mL        Appearance: Normal	  Psychiatry: A & O x 3, Mood & affect appropriate  HEENT:   Normal oral mucosa, PERRL, EOMI	  Lymphatic: No lymphadenopathy  Cardiovascular: Normal S1 S2,RRR +  murmurs  Respiratory: Lungs clear to auscultation	  Gastrointestinal:  Soft, Non-tender, + BS	  Skin: No rashes, No ecchymoses, No cyanosis	  Neurologic: Non-focal  Extremities: Normal range of motion, No clubbing, cyanosis or edema  Vascular: Peripheral pulses palpable 2+ bilaterally    TELEMETRY:  nsr hr 60-70	    ECG:  	  RADIOLOGY:  CT Head No Cont (03.22.20 @ 13:01) >    IMPRESSION:    Left greater than right small bifrontal CSF extra-axial density which may represent prominent subarachnoid spaces, subdural hygromas or chronic subdural hematomas.    Otherwise unremarkable exam.       CT Chest w/ IV Cont (03.22.20 @ 06:03) >  IMPRESSION:      Status post median sternotomy with disunion of the sternum and dehiscence of the sternal wires, new since prior x-ray 11/21/2016. No pneumothorax. No pneumomediastinum.     Moderate upper lobe predominant centrilobular emphysema. No evidence of pneumonia.    Prominent gallbladder wall thickening in the body and fundus of the gallbladder up to 1 cm, which may be related to collapse, possibly adenomyomatosis; consider nonemergent contrast enhanced MRI for further evaluation as indicated.            < end of copied text >      OTHER: 	  	  LABS:	 	    CARDIAC MARKERS:  Troponin T, High Sensitivity Result: 21 ng/L (03-22 @ 04:22)                                  16.5   6.99  )-----------( 147      ( 23 Mar 2020 10:10 )             51.6     03-23    132<L>  |  98  |  21  ----------------------------<  119<H>  4.5   |  20<L>  |  1.14    Ca    8.6      23 Mar 2020 10:10    TPro  6.7  /  Alb  3.6  /  TBili  1.0  /  DBili  x   /  AST  36  /  ALT  39  /  AlkPhos  31<L>  03-22    PT/INR - ( 22 Mar 2020 04:22 )   PT: 13.3 sec;   INR: 1.16 ratio         PTT - ( 22 Mar 2020 04:22 )  PTT:34.5 sec  proBNP:   Lipid Profile:   HgA1c:   TSH: CARDIOLOGY CONSULT - Dr. Suh         HPI:  64 M   h/o  CAD, S/P CABG 2016, MI/  STEMI 2016, s/p  cardiogenic shock , s/p AICD, mod-sev MR, Systolic CHF,  presenting with here for 2 episode of syncope in the setting of diarrhea x 2 days.  He reports profuse loose stools. No recent abx use. Decrease PO intake.  Had two episodes of passing out, each time while he was standing up from a lying down position. Both times were on his bed, no injuries. No fevers, chills. He otherwise denies any cp, sob, palps. orthopnea or le edema.   echo 11/20/16 EF 20%, sev global LV dysfx. mod pulm htn, mod to severe MR   Cath 11/18/16 with patent grafts     PAST MEDICAL & SURGICAL HISTORY:  COPD (chronic obstructive pulmonary disease)  Former smoker  CAD (coronary artery disease): CABG 2 vessel at [facility] may 2016  HLD (hyperlipidemia)  ATA (obstructive sleep apnea)  MI (myocardial infarction): May 2016  S/P CABG x 2          PREVIOUS DIAGNOSTIC TESTING:    [ ] Echocardiogram:    < from: Transthoracic Echocardiogram (11.20.16 @ 18:09) >  EF (Corley Rule): 20 %  ------------------------------------------------------------------------  Observations:  Mitral Valve: Mitral annular calcification. Tethered mitral  valve leaflets with normal opening. Moderate-severe mitral  regurgitation.  Aortic Valve/Aorta: Calcified trileaflet aortic valvewith  normal opening.  Aortic Root: 2.8 cm.  Left Atrium: Severely dilated left atrium.  LA volume index  = 54 cc/m2.  Left Ventricle: Endocardial visualization enhanced with  intravenous injection of echo contrast (Definity). Severe  global left ventricular systolic dysfunction. Eccentric  left ventricular hypertrophy (dilated left ventricle with  normal relative wall thickness).  Right Heart: Right atrial enlargement. Normal right  ventricular size with decreased right ventricular systolic  function. Normal tricuspid valve. Mild-moderate tricuspid  regurgitation. Normal pulmonic valve.  Pericardium/Pleura: Normal pericardium with no pericardial  effusion.  Hemodynamic: Estimated right atrial pressure is 8 mm Hg.  Estimated right ventricularsystolic pressure equals 58 mm  Hg, assuming right atrial pressure equals 8 mm Hg,  consistent with moderate pulmonary hypertension.  ------------------------------------------------------------------------  Conclusions:  1. Mitral annular calcification. Tethered mitral valve  leaflets with normal opening. Moderate-severe mitral  regurgitation.  2. Eccentric left ventricular hypertrophy (dilated left  ventricle with normal relative wall thickness).  3. Endocardial visualization enhanced with intravenous  injection of echo contrast (Definity). Severe global left  ventricular systolic dysfunction.  4. Normal right ventricular size with decreased right  ventricular systolic function.  5. Estimated right ventricular systolic pressure equals 58  mm Hg, assuming right atrial pressure equals 8 mm Hg,  consistent with moderate pulmonary hypertension.  *** No previous Echo exam.  ------------------------------------------------------------------------  Confirmed on  11/20/2016 - 16:44:11 by Joellen Johnson M.D.  ------------------------------------------------------------------------      [ ]  Catheterization:    < from: Cardiac Cath Lab - Adult (11.18.16 @ 14:32) >  VENTRICLES: EF estimated was 20 %.  CORONARY VESSELS: The coronary circulation is right dominant.  LM:   --  Ostial LM: There was a 100 % stenosis.  RCA:   --  RCA: Angiography showed minor luminal irregularities with no  flow limiting lesions.  GRAFTS:   --  Graft to the mid LAD: The graft was a LIMA. Graft angiography  showed minor luminal irregularities.  --  Graft to the ramus intermedius: The graft was a saphenous vein graft  from the aorta. Graft angiography showed minor luminal irregularities.  COMPLICATIONS: There were no complications.  DIAGNOSTIC RECOMMENDATIONS: The patient should continue with the present  medications.  Prepared and signed by  Ar Virk M.D.    [ ] Stress Test:  	    MEDICATIONS:  Home Medications:  aspirin 81 mg oral tablet: 1 tab(s) orally once a day (23 Mar 2020 11:54)  carvedilol 6.25 mg oral tablet: 1 tab(s) orally 2 times a day (23 Mar 2020 11:54)  clopidogrel 75 mg oral tablet: 1 tab(s) orally once a day (23 Mar 2020 11:54)  Entresto 97 mg-103 mg oral tablet: 1 tab(s) orally 2 times a day (23 Mar 2020 11:54)  furosemide 20 mg oral tablet: 2 tabs (=40 mg) orally once a day (23 Mar 2020 11:54)  Zetia 10 mg oral tablet: 1 tab(s) orally once a day (23 Mar 2020 11:54)      MEDICATIONS  (STANDING):  aspirin enteric coated 81 milliGRAM(s) Oral daily  atorvastatin 80 milliGRAM(s) Oral at bedtime  clopidogrel Tablet 75 milliGRAM(s) Oral daily  heparin  Injectable 5000 Unit(s) SubCutaneous every 12 hours  lactobacillus acidophilus 1 Tablet(s) Oral three times a day  midodrine. 2.5 milliGRAM(s) Oral three times a day  sodium chloride 0.9%. 1000 milliLiter(s) (70 mL/Hr) IV Continuous <Continuous>  vancomycin    Solution 125 milliGRAM(s) Oral every 6 hours      FAMILY HISTORY:  No pertinent family history in first degree relatives      SOCIAL HISTORY:    [x ] former smoker     Allergies    No Known Allergies    Intolerances    	    REVIEW OF SYSTEMS:  CONSTITUTIONAL: No fever, weight loss, or fatigue  EYES: No eye pain, visual disturbances, or discharge  ENMT:  No difficulty hearing, tinnitus, vertigo; No sinus or throat pain  NECK: No pain or stiffness  RESPIRATORY: No cough, wheezing, chills or hemoptysis; No Shortness of Breath  CARDIOVASCULAR: see hpi   GASTROINTESTINAL: see hpi  GENITOURINARY: No dysuria, frequency, hematuria, or incontinence  NEUROLOGICAL: No headaches, memory loss, loss of strength, numbness, or tremors  SKIN: No itching, burning, rashes, or lesions   	    [x ] All others negative	  [ ] Unable to obtain    PHYSICAL EXAM:  T(C): 37.3 (03-23-20 @ 04:51), Max: 38.1 (03-22-20 @ 20:34)  HR: 93 (03-23-20 @ 04:51) (80 - 93)  BP: 98/62 (03-23-20 @ 04:51) (98/62 - 107/68)  RR: 18 (03-23-20 @ 04:51) (17 - 18)  SpO2: 91% (03-23-20 @ 04:51) (91% - 96%)  Wt(kg): --  I&O's Summary    22 Mar 2020 07:01  -  23 Mar 2020 07:00  --------------------------------------------------------  IN: 600 mL / OUT: 0 mL / NET: 600 mL    23 Mar 2020 07:01  -  23 Mar 2020 12:42  --------------------------------------------------------  IN: 120 mL / OUT: 0 mL / NET: 120 mL        Appearance: Normal	  Psychiatry: A & O x 3, Mood & affect appropriate  HEENT:   Normal oral mucosa, PERRL, EOMI	  Lymphatic: No lymphadenopathy  Cardiovascular: Normal S1 S2,RRR +  murmurs  Respiratory: Lungs clear to auscultation	  Gastrointestinal:  Soft, Non-tender, + BS	  Skin: No rashes, No ecchymoses, No cyanosis	  Neurologic: Non-focal  Extremities: Normal range of motion, No clubbing, cyanosis or edema  Vascular: Peripheral pulses palpable 2+ bilaterally    TELEMETRY:  nsr hr 60-70	    ECG:  	Pending   RADIOLOGY:  CT Head No Cont (03.22.20 @ 13:01) >    IMPRESSION:    Left greater than right small bifrontal CSF extra-axial density which may represent prominent subarachnoid spaces, subdural hygromas or chronic subdural hematomas.    Otherwise unremarkable exam.       CT Chest w/ IV Cont (03.22.20 @ 06:03) >  IMPRESSION:      Status post median sternotomy with disunion of the sternum and dehiscence of the sternal wires, new since prior x-ray 11/21/2016. No pneumothorax. No pneumomediastinum.     Moderate upper lobe predominant centrilobular emphysema. No evidence of pneumonia.    Prominent gallbladder wall thickening in the body and fundus of the gallbladder up to 1 cm, which may be related to collapse, possibly adenomyomatosis; consider nonemergent contrast enhanced MRI for further evaluation as indicated.            < end of copied text >      OTHER: 	  	  LABS:	 	    CARDIAC MARKERS:  Troponin T, High Sensitivity Result: 21 ng/L (03-22 @ 04:22)                                  16.5   6.99  )-----------( 147      ( 23 Mar 2020 10:10 )             51.6     03-23    132<L>  |  98  |  21  ----------------------------<  119<H>  4.5   |  20<L>  |  1.14    Ca    8.6      23 Mar 2020 10:10    TPro  6.7  /  Alb  3.6  /  TBili  1.0  /  DBili  x   /  AST  36  /  ALT  39  /  AlkPhos  31<L>  03-22    PT/INR - ( 22 Mar 2020 04:22 )   PT: 13.3 sec;   INR: 1.16 ratio         PTT - ( 22 Mar 2020 04:22 )  PTT:34.5 sec  proBNP:   Lipid Profile:   HgA1c:   TSH:

## 2020-03-23 NOTE — CONSULT NOTE ADULT - ASSESSMENT
64 M   h/o   MI/  STEMI 2016 .  s/p    cardiogenic shock ,    s/p AICD placement    here for 2 episode of syncope   in the setting of diarrhea x 2 days.     Has been having 3 episodes of profuse loose stools. No recent abx use.     Has been following strict fluid control prescribed by Dr Vincent.    Had two episodes of passing out, each time while he was standing up from a lying down position. Here because he passed out second time . Both times were on his bed, no injuries. No fevers, chills.        Cards: Jhony Thomas MD  PCP: Raul Harris MD   Aspirin 81 MG TABS  Atorvastatin Calcium 80 MG Oral Tablet; Take 1 tablet daily  Carvedilol 6.25 MG Oral Tablet; Take 1 tablet twice daily  Entresto  MG Oral Tablet; TAKE 1 TABLET BY MOUTH TWICE A DAY  Ezetimibe 10 MG Oral Tablet; Take 1 tablet daily  Lasix 20 MG Oral Tablet; TAKE 1 TABLET EVERY OTHER DAY  metFORMIN HCl - 500 MG Oral Tablet; Take 1 tablet twice daily  Plavix 75 MG Oral Tablet  Spironolactone 25 MG Oral Tablet; TAKE 1 TABLET DAILY  Tudorza Pressair 400 MCG/ACT Inhalation Aerosol Powder Breath Activated; 1 puff BID as directed (22 Mar 2020 10:40)    ER vitals:  T 98, P 98, BP 74/48.  WBC 11.9.  Cdiff PCR (+).  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 No pneumothorax. No pneumomediastinum.  Moderate upper lobe predominant centrilobular emphysema. No evidence of pneumonia. Prominent gallbladder wall thickening in the body and fundus of the gallbladder up to 1 cm, which may be related to collapse, possibly adenomyomatosis.   Pt on oral vanco for Cdiff trt.  ID consult called for further managment.       C.diff:    - Pt with profuse watery diarrhea, p/w syncope, low BP, elevated WBC.  Agree with vanco 125mg PO q6.    - Cont supportive care, IVF hydration, replete electrolytes.      - Monitor serial abdominal exam, stool output and consistency.  If pt not improving, consider adding adjunctive therapy (i.e. cholestyramine)    - Pt with low grade fever 100.5 last night.  Cont to monitor.  If spikes fever again, send blood cultures and full sepsis w/u.  Pt states he has a chronic cough due to smoking, has remained at home for all of last week.  Denies SOB.  CT chest with predominant upper lobe emphysema.     - Maintain contact precautions.       Will follow,    Demi Gallegos  [phone]

## 2020-03-23 NOTE — PROGRESS NOTE ADULT - SUBJECTIVE AND OBJECTIVE BOX
febrile/  diarrhea/  no abd  pain    REVIEW OF SYSTEMS:  GEN:  fever,    no chills  RESP: no SOB,   no cough  CVS: no chest pain,   no palpitations  GI: no abdominal pain,   no nausea,   no vomiting,   no constipation,  diarrhea  : no dysuria,   no frequency  NEURO: no headache,   no dizziness  PSYCH: no depression,   not anxious  Derm : no rash    MEDICATIONS  (STANDING):  aspirin enteric coated 81 milliGRAM(s) Oral daily  atorvastatin 80 milliGRAM(s) Oral at bedtime  clopidogrel Tablet 75 milliGRAM(s) Oral daily  heparin  Injectable 5000 Unit(s) SubCutaneous every 12 hours  sodium chloride 0.9% Bolus 500 milliLiter(s) IV Bolus once  sodium chloride 0.9%. 1000 milliLiter(s) (50 mL/Hr) IV Continuous <Continuous>  vancomycin    Solution 125 milliGRAM(s) Oral every 6 hours    MEDICATIONS  (PRN):      Vital Signs Last 24 Hrs  T(C): 37.3 (23 Mar 2020 04:51), Max: 38.1 (22 Mar 2020 20:34)  T(F): 99.2 (23 Mar 2020 04:51), Max: 100.5 (22 Mar 2020 20:34)  HR: 93 (23 Mar 2020 04:51) (80 - 93)  BP: 98/62 (23 Mar 2020 04:51) (82/58 - 107/68)  BP(mean): --  RR: 18 (23 Mar 2020 04:51) (17 - 18)  SpO2: 91% (23 Mar 2020 04:51) (91% - 97%)  CAPILLARY BLOOD GLUCOSE        I&O's Summary    22 Mar 2020 07:01  -  23 Mar 2020 07:00  --------------------------------------------------------  IN: 600 mL / OUT: 0 mL / NET: 600 mL        PHYSICAL EXAM:  HEAD:  Atraumatic, Normocephalic  NECK: Supple, No   JVD  CHEST/LUNG:   no     rales,     no,    rhonchi  HEART: Regular rate and rhythm;         murmur  ABDOMEN: Soft, Nontender, ;   EXTREMITIES:   no     edema  NEUROLOGY:  alert    LABS:                        15.4   11.97 )-----------( 138      ( 22 Mar 2020 04:22 )             46.1         132<L>  |  97  |  40<H>  ----------------------------<  131<H>  4.8   |  18<L>  |  1.60<H>    Ca    8.3<L>      22 Mar 2020 04:22    TPro  6.7  /  Alb  3.6  /  TBili  1.0  /  DBili  x   /  AST  36  /  ALT  39  /  AlkPhos  31<L>      PT/INR - ( 22 Mar 2020 04:22 )   PT: 13.3 sec;   INR: 1.16 ratio         PTT - ( 22 Mar 2020 04:22 )  PTT:34.5 sec      Urinalysis Basic - ( 22 Mar 2020 08:12 )    Color: Yellow / Appearance: Clear / S.041 / pH: x  Gluc: x / Ketone: Negative  / Bili: Negative / Urobili: Negative   Blood: x / Protein: Trace / Nitrite: Negative   Leuk Esterase: Negative / RBC: 1 /hpf / WBC 1 /HPF   Sq Epi: x / Non Sq Epi: 1 /hpf / Bacteria: Negative           @ 07:10  4.1  27            Culture - Urine (collected 20 @ 12:31)  Source: .Urine Clean Catch (Midstream)  Final Report (20 @ 07:33):    No growth        Consultant(s) Notes Reviewed:      Care Discussed with Consultants/Other Providers:

## 2020-03-23 NOTE — CONSULT NOTE ADULT - ATTENDING COMMENTS
Agree with above NP note.  Patient with history of CAD, S/P CABG 2016, STEMI 2016, s/p cardiogenic shock , s/p AICD, mod-sev MR, Systolic CHF admitted with syncope in the setting of diarrhea x 2 days    syncope secondary to hypovolemia, orthostatic hypotension  cv stable  hold all lv dysfxn, diuretics   midodrine as needed for bp support  tx of c diff per med

## 2020-03-23 NOTE — CONSULT NOTE ADULT - SUBJECTIVE AND OBJECTIVE BOX
Patient is a 64y old  Male who presents with a chief complaint of syncope (23 Mar 2020 08:46)      HPI:  64 M   h/o   MI/  STEMI 2016 .  s/p    cardiogenic shock ,    s/p AICD placement    here for 2 episode of syncope   in the setting of diarrhea x 2 days.     Has been having 3 episodes of profuse loose stools. No recent abx use.     Has been following strict fluid control prescribed by Dr Vincent.    Had two episodes of passing out, each time while he was standing up from a lying down position. Here because he passed out second time . Both times were on his bed, no injuries. No fevers, chills.        Cards: Jhony Thomas MD  PCP: Raul Harris MD   Aspirin 81 MG TABS  Atorvastatin Calcium 80 MG Oral Tablet; Take 1 tablet daily  Carvedilol 6.25 MG Oral Tablet; Take 1 tablet twice daily  Entresto  MG Oral Tablet; TAKE 1 TABLET BY MOUTH TWICE A DAY  Ezetimibe 10 MG Oral Tablet; Take 1 tablet daily  Lasix 20 MG Oral Tablet; TAKE 1 TABLET EVERY OTHER DAY  metFORMIN HCl - 500 MG Oral Tablet; Take 1 tablet twice daily  Plavix 75 MG Oral Tablet  Spironolactone 25 MG Oral Tablet; TAKE 1 TABLET DAILY  Tudorza Pressair 400 MCG/ACT Inhalation Aerosol Powder Breath Activated; 1 puff BID as directed (22 Mar 2020 10:40)    ER vitals:  T 98, P 98, BP 74/48.  WBC 11.9.  Cdiff PCR (+).  CT c/a/p shows status post median sternotomy with disunion of the sternum and dehiscence of the sternal wires, new since prior x-ray 2016. No pneumothorax. No pneumomediastinum.  Moderate upper lobe predominant centrilobular emphysema. No evidence of pneumonia. Prominent gallbladder wall thickening in the body and fundus of the gallbladder up to 1 cm, which may be related to collapse, possibly adenomyomatosis.   Pt on oral vanco for Cdiff trt.  ID consult called for further managment.       REVIEW OF SYSTEMS:    CONSTITUTIONAL: No fever, weight loss, or fatigue  EYES: No eye pain, visual disturbances, or discharge  ENMT:  No sore throat  NECK: No pain or stiffness  RESPIRATORY: No cough, wheezing, chills or hemoptysis; No shortness of breath  CARDIOVASCULAR: No chest pain, palpitations, dizziness, or leg swelling  GASTROINTESTINAL: No abdominal or epigastric pain. No nausea, vomiting, or hematemesis; No diarrhea or constipation. No melena or hematochezia.  GENITOURINARY: No dysuria, frequency, hematuria, or incontinence  NEUROLOGICAL: No headaches, memory loss, loss of strength, numbness, or tremors  SKIN: No itching, burning, rashes, or lesions   LYMPH NODES: No enlarged glands  MUSCULOSKELETAL: No joint pain or swelling; No muscle, back, or extremity pain      PAST MEDICAL & SURGICAL HISTORY:  COPD (chronic obstructive pulmonary disease)  Former smoker  CAD (coronary artery disease): CABG 2 vessel at [facility] may 2016  HLD (hyperlipidemia)  ATA (obstructive sleep apnea)  MI (myocardial infarction): May 2016  S/P CABG x 2      Allergies    No Known Allergies    Intolerances        FAMILY HISTORY:  No pertinent family history in first degree relatives      SOCIAL HISTORY:        MEDICATIONS  (STANDING):  aspirin enteric coated 81 milliGRAM(s) Oral daily  atorvastatin 80 milliGRAM(s) Oral at bedtime  clopidogrel Tablet 75 milliGRAM(s) Oral daily  heparin  Injectable 5000 Unit(s) SubCutaneous every 12 hours  sodium chloride 0.9%. 1000 milliLiter(s) (70 mL/Hr) IV Continuous <Continuous>  vancomycin    Solution 125 milliGRAM(s) Oral every 6 hours    MEDICATIONS  (PRN):      Vital Signs Last 24 Hrs  T(C): 37.3 (23 Mar 2020 04:51), Max: 38.1 (22 Mar 2020 20:34)  T(F): 99.2 (23 Mar 2020 04:51), Max: 100.5 (22 Mar 2020 20:34)  HR: 93 (23 Mar 2020 04:51) (80 - 93)  BP: 98/62 (23 Mar 2020 04:51) (82/58 - 107/68)  BP(mean): --  RR: 18 (23 Mar 2020 04:51) (17 - 18)  SpO2: 91% (23 Mar 2020 04:51) (91% - 97%)    PHYSICAL EXAM:    GENERAL: NAD, well-groomed  HEAD:  Atraumatic, Normocephalic  EYES: EOMI, PERRLA, conjunctiva and sclera clear  ENMT: No tonsillar erythema, exudates, or enlargement; Moist mucous membranes  NECK: Supple, No JVD  CHEST/LUNG: Clear to percussion bilaterally; No rales, rhonchi, wheezing, or rubs  HEART: Regular rate and rhythm; No murmurs, rubs, or gallops  ABDOMEN: Soft, Nontender, Nondistended; Bowel sounds present  EXTREMITIES:  2+ Peripheral Pulses, No clubbing, cyanosis, or edema  LYMPH: No lymphadenopathy noted  SKIN: No rashes or lesions    LABS:  CBC Full  -  ( 22 Mar 2020 04:22 )  WBC Count : 11.97 K/uL  RBC Count : 5.19 M/uL  Hemoglobin : 15.4 g/dL  Hematocrit : 46.1 %  Platelet Count - Automated : 138 K/uL  Mean Cell Volume : 88.8 fl  Mean Cell Hemoglobin : 29.7 pg  Mean Cell Hemoglobin Concentration : 33.4 gm/dL  Auto Neutrophil # : 9.71 K/uL  Auto Lymphocyte # : 1.21 K/uL  Auto Monocyte # : 0.92 K/uL  Auto Eosinophil # : 0.08 K/uL  Auto Basophil # : 0.02 K/uL  Auto Neutrophil % : 81.0 %  Auto Lymphocyte % : 10.1 %  Auto Monocyte % : 7.7 %  Auto Eosinophil % : 0.7 %  Auto Basophil % : 0.2 %          132<L>  |  97  |  40<H>  ----------------------------<  131<H>  4.8   |  18<L>  |  1.60<H>    Ca    8.3<L>      22 Mar 2020 04:22    TPro  6.7  /  Alb  3.6  /  TBili  1.0  /  DBili  x   /  AST  36  /  ALT  39  /  AlkPhos  31<L>        LIVER FUNCTIONS - ( 22 Mar 2020 04:22 )  Alb: 3.6 g/dL / Pro: 6.7 g/dL / ALK PHOS: 31 U/L / ALT: 39 U/L / AST: 36 U/L / GGT: x                               MICROBIOLOGY:        Urinalysis Basic - ( 22 Mar 2020 08:12 )    Color: Yellow / Appearance: Clear / S.041 / pH: x  Gluc: x / Ketone: Negative  / Bili: Negative / Urobili: Negative   Blood: x / Protein: Trace / Nitrite: Negative   Leuk Esterase: Negative / RBC: 1 /hpf / WBC 1 /HPF   Sq Epi: x / Non Sq Epi: 1 /hpf / Bacteria: Negative      Culture - Urine (20 @ 12:31)    Specimen Source: .Urine Clean Catch (Midstream)    Culture Results:   No growth    C. difficile GDH &amp; toxins A/B by EIA (20 @ 12:25)    Clostridium difficile GDH Toxins A&amp;B, EIA:   Indeterminate    Clostridium difficile GDH Interpretation: This specimen is positive for C. Difficile glutamate dehydrogenase (GDH)  antigen and negative for C. Difficile Toxins A & B, by EIA.  GDH is a  highly sensitive screening marker for C. Difficile that is produced in  large amounts by all C. Difficilestrains, both toxigenic and  nontoxigenic.  Specimens that are GDH positive and toxin negative will be  tested further by PCR to detect toxin gene sequences associated with  toxin producing C. Difficle.    Clostridium difficile Toxin by PCR (20 @ 12:25)    Clostridium difficile Toxin by PCR: The results of this test should be interpreted with consideration of all  clinical and laboratory findings. This test determines the presence of  the C. difficile tcdB gene at a given time and is not intended to  identify antibiotic associated disease or C. difficile infection without  clinical context.  Successful treatment is based on the resolution of clinical symptoms.  This test should not be used as a "test of cure" because C. difficile DNA  will persist after successful treatment. Repeat testing will not be  permitted.    This test is performed on the BD MAX system using Real-Time PCR and  fluorogenic target-specific hybridization.    C Diff by PCR Result: Detected              RADIOLOGY:    < from: CT Head No Cont (20 @ 13:01) >    IMPRESSION:    Left greater than right small bifrontal CSF extra-axial density which may represent prominent subarachnoid spaces, subdural hygromas or chronic subdural hematomas.    Otherwise unremarkable exam.    < end of copied text >          < from: CT Chest w/ IV Cont (20 @ 06:03) >  IMPRESSION:      Status post median sternotomy with disunion of the sternum and dehiscence of the sternal wires, new since prior x-ray 2016. No pneumothorax. No pneumomediastinum.     Moderate upper lobe predominant centrilobular emphysema. No evidence of pneumonia.    Prominent gallbladder wall thickening in the body and fundus of the gallbladder up to 1 cm, which may be related to collapse, possibly adenomyomatosis; consider nonemergent contrast enhanced MRI for further evaluation as indicated.    < end of copied text >        < from: CT Abdomen and Pelvis w/ IV Cont (20 @ 06:03) >  EXAM:  CT ABDOMEN AND PELVIS IC                          EXAM:  CT CHEST IC                            PROCEDURE DATE:  2020            INTERPRETATION:  CLINICAL INFORMATION: Sepsis and abdominal pain.    COMPARISON: Chest x-ray from earlier the same day at 2016..    PROCEDURE:   CT of the Chest, Abdomen and Pelvis was performed with intravenous contrast.   Intravenous contrast: 90 ml Omnipaque 350. 10 ml discarded.  Oral contrast: None.  Sagittal and coronal reformats were performed.    FINDINGS:    CHEST:     LUNGS AND LARGE AIRWAYS: Patent central airways. Moderate upper lobe predominant centrilobular emphysema. 2 mm subpleural nodule in the left lower lobe (2:71).  PLEURA: No pleural effusion. No pneumothorax.  VESSELS: Atherosclerosis.  HEART: Heart size is normal. No pericardial effusion. Left-sided AICD with leads terminating in the right atrium and right ventricle. Coronary artery calcifications.  MEDIASTINUM AND REYNOLD: Borderline enlarged 1.0 cm in short axis right paratracheal lymph node.  CHEST WALL AND LOWER NECK: Dehiscence of sternotomy wires, new since prior x-ray 2016. Disunion of the sternum.    ABDOMEN AND PELVIS:    LIVER: Liver cysts and subcentimeter hypodensities too small to characterize.  BILE DUCTS: Common bile duct is collapsed and measures 2 mm.  GALLBLADDER: The gallbladder is collapsed. There is prominent gallbladder wall thickening in the body and fundus of the gallbladder up to 1 cm. Multiple punctate gallstones in the neck of the gallbladder.  SPLEEN: Within normal limits.  PANCREAS: Within normal limits.  ADRENALS: Within normal limits.  KIDNEYS/URETERS: Bilateral renal cysts.    BLADDER: Within normal limits.  REPRODUCTIVE ORGANS: Prostate is mildly enlarged.    BOWEL: Smallhiatal hernia. Colonic diverticulosis without acute diverticulitis. No bowel obstruction. Appendix is normal.  PERITONEUM: No ascites.  VESSELS: Atherosclerotic changes.  RETROPERITONEUM/LYMPH NODES: No lymphadenopathy.    ABDOMINAL WALL: Within normal limits.  BONES: Degenerative changes.    IMPRESSION:      Status post median sternotomy with disunion of the sternum and dehiscence of the sternal wires, new since prior x-ray 2016. No pneumothorax. No pneumomediastinum.     Moderate upper lobe predominant centrilobular emphysema. No evidence of pneumonia.    Prominent gallbladder wall thickening in the body and fundus of the gallbladder up to 1 cm, which may be related to collapse, possibly adenomyomatosis; consider nonemergent contrast enhanced MRI for further evaluation as indicated.    < end of copied text >            < from: Xray Chest 1 View AP/PA (20 @ 05:26) >      IMPRESSION:     Clear lungs.  Dehiscence of multiple sternal wires, new since 2016. Follow-up already ordered chest CT to further evaluate.    < end of copied text > Patient is a 64y old  Male who presents with a chief complaint of syncope (23 Mar 2020 08:46)      HPI:  64 M   h/o   MI/  STEMI 2016 .  s/p    cardiogenic shock ,    s/p AICD placement    here for 2 episode of syncope   in the setting of diarrhea x 2 days.     Has been having 3 episodes of profuse loose stools. No recent abx use.     Has been following strict fluid control prescribed by Dr Vincent.    Had two episodes of passing out, each time while he was standing up from a lying down position. Here because he passed out second time . Both times were on his bed, no injuries. No fevers, chills.        Cards: Jhony Thomas MD  PCP: Raul Harris MD   Aspirin 81 MG TABS  Atorvastatin Calcium 80 MG Oral Tablet; Take 1 tablet daily  Carvedilol 6.25 MG Oral Tablet; Take 1 tablet twice daily  Entresto  MG Oral Tablet; TAKE 1 TABLET BY MOUTH TWICE A DAY  Ezetimibe 10 MG Oral Tablet; Take 1 tablet daily  Lasix 20 MG Oral Tablet; TAKE 1 TABLET EVERY OTHER DAY  metFORMIN HCl - 500 MG Oral Tablet; Take 1 tablet twice daily  Plavix 75 MG Oral Tablet  Spironolactone 25 MG Oral Tablet; TAKE 1 TABLET DAILY  Tudorza Pressair 400 MCG/ACT Inhalation Aerosol Powder Breath Activated; 1 puff BID as directed (22 Mar 2020 10:40)    ER vitals:  T 98, P 98, BP 74/48.  WBC 11.9.  Cdiff PCR (+).  CT c/a/p shows status post median sternotomy with disunion of the sternum and dehiscence of the sternal wires, new since prior x-ray 2016. No pneumothorax. No pneumomediastinum.  Moderate upper lobe predominant centrilobular emphysema. No evidence of pneumonia. Prominent gallbladder wall thickening in the body and fundus of the gallbladder up to 1 cm, which may be related to collapse, possibly adenomyomatosis.   Pt on oral vanco for Cdiff trt.  ID consult called for further managment.       REVIEW OF SYSTEMS:    CONSTITUTIONAL: No fever, weight loss, or fatigue  EYES: No eye pain, visual disturbances, or discharge  ENMT:  No sore throat  NECK: No pain or stiffness  RESPIRATORY: No cough, wheezing, chills or hemoptysis; No shortness of breath  CARDIOVASCULAR: No chest pain, palpitations, dizziness, or leg swelling  GASTROINTESTINAL: No abdominal or epigastric pain. No nausea, vomiting, or hematemesis; No diarrhea or constipation. No melena or hematochezia.  GENITOURINARY: No dysuria, frequency, hematuria, or incontinence  NEUROLOGICAL: No headaches, memory loss, loss of strength, numbness, or tremors  SKIN: No itching, burning, rashes, or lesions   LYMPH NODES: No enlarged glands  MUSCULOSKELETAL: No joint pain or swelling; No muscle, back, or extremity pain      PAST MEDICAL & SURGICAL HISTORY:  COPD (chronic obstructive pulmonary disease)  Former smoker  CAD (coronary artery disease): CABG 2 vessel at [facility] may 2016  HLD (hyperlipidemia)  ATA (obstructive sleep apnea)  MI (myocardial infarction): May 2016  S/P CABG x 2      Allergies    No Known Allergies    Intolerances        FAMILY HISTORY:  No pertinent family history in first degree relatives      SOCIAL HISTORY:    Prior smoker, no ivdu, etoh.  No recent travel.     MEDICATIONS  (STANDING):  aspirin enteric coated 81 milliGRAM(s) Oral daily  atorvastatin 80 milliGRAM(s) Oral at bedtime  clopidogrel Tablet 75 milliGRAM(s) Oral daily  heparin  Injectable 5000 Unit(s) SubCutaneous every 12 hours  sodium chloride 0.9%. 1000 milliLiter(s) (70 mL/Hr) IV Continuous <Continuous>  vancomycin    Solution 125 milliGRAM(s) Oral every 6 hours    MEDICATIONS  (PRN):      Vital Signs Last 24 Hrs  T(C): 37.3 (23 Mar 2020 04:51), Max: 38.1 (22 Mar 2020 20:34)  T(F): 99.2 (23 Mar 2020 04:51), Max: 100.5 (22 Mar 2020 20:34)  HR: 93 (23 Mar 2020 04:51) (80 - 93)  BP: 98/62 (23 Mar 2020 04:51) (82/58 - 107/68)  BP(mean): --  RR: 18 (23 Mar 2020 04:51) (17 - 18)  SpO2: 91% (23 Mar 2020 04:51) (91% - 97%)    PHYSICAL EXAM:    GENERAL: NAD, well-groomed  HEAD:  Atraumatic, Normocephalic  EYES: EOMI, PERRLA, conjunctiva and sclera clear  ENMT: No tonsillar erythema, exudates, or enlargement; Moist mucous membranes  NECK: Supple, No JVD  CHEST/LUNG: Clear to percussion bilaterally; No rales, rhonchi, wheezing, or rubs  HEART: Regular rate and rhythm; No murmurs, rubs, or gallops  ABDOMEN: Soft, Nontender, Nondistended; Bowel sounds present  EXTREMITIES:  2+ Peripheral Pulses, No clubbing, cyanosis, or edema  LYMPH: No lymphadenopathy noted  SKIN: No rashes or lesions    LABS:  CBC Full  -  ( 22 Mar 2020 04:22 )  WBC Count : 11.97 K/uL  RBC Count : 5.19 M/uL  Hemoglobin : 15.4 g/dL  Hematocrit : 46.1 %  Platelet Count - Automated : 138 K/uL  Mean Cell Volume : 88.8 fl  Mean Cell Hemoglobin : 29.7 pg  Mean Cell Hemoglobin Concentration : 33.4 gm/dL  Auto Neutrophil # : 9.71 K/uL  Auto Lymphocyte # : 1.21 K/uL  Auto Monocyte # : 0.92 K/uL  Auto Eosinophil # : 0.08 K/uL  Auto Basophil # : 0.02 K/uL  Auto Neutrophil % : 81.0 %  Auto Lymphocyte % : 10.1 %  Auto Monocyte % : 7.7 %  Auto Eosinophil % : 0.7 %  Auto Basophil % : 0.2 %      03    132<L>  |  97  |  40<H>  ----------------------------<  131<H>  4.8   |  18<L>  |  1.60<H>    Ca    8.3<L>      22 Mar 2020 04:22    TPro  6.7  /  Alb  3.6  /  TBili  1.0  /  DBili  x   /  AST  36  /  ALT  39  /  AlkPhos  31<L>  -22      LIVER FUNCTIONS - ( 22 Mar 2020 04:22 )  Alb: 3.6 g/dL / Pro: 6.7 g/dL / ALK PHOS: 31 U/L / ALT: 39 U/L / AST: 36 U/L / GGT: x                               MICROBIOLOGY:        Urinalysis Basic - ( 22 Mar 2020 08:12 )    Color: Yellow / Appearance: Clear / S.041 / pH: x  Gluc: x / Ketone: Negative  / Bili: Negative / Urobili: Negative   Blood: x / Protein: Trace / Nitrite: Negative   Leuk Esterase: Negative / RBC: 1 /hpf / WBC 1 /HPF   Sq Epi: x / Non Sq Epi: 1 /hpf / Bacteria: Negative      Culture - Urine (20 @ 12:31)    Specimen Source: .Urine Clean Catch (Midstream)    Culture Results:   No growth    C. difficile GDH &amp; toxins A/B by EIA (20 @ 12:25)    Clostridium difficile GDH Toxins A&amp;B, EIA:   Indeterminate    Clostridium difficile GDH Interpretation: This specimen is positive for C. Difficile glutamate dehydrogenase (GDH)  antigen and negative for C. Difficile Toxins A & B, by EIA.  GDH is a  highly sensitive screening marker for C. Difficile that is produced in  large amounts by all C. Difficilestrains, both toxigenic and  nontoxigenic.  Specimens that are GDH positive and toxin negative will be  tested further by PCR to detect toxin gene sequences associated with  toxin producing C. Difficle.    Clostridium difficile Toxin by PCR (20 @ 12:25)    Clostridium difficile Toxin by PCR: The results of this test should be interpreted with consideration of all  clinical and laboratory findings. This test determines the presence of  the C. difficile tcdB gene at a given time and is not intended to  identify antibiotic associated disease or C. difficile infection without  clinical context.  Successful treatment is based on the resolution of clinical symptoms.  This test should not be used as a "test of cure" because C. difficile DNA  will persist after successful treatment. Repeat testing will not be  permitted.    This test is performed on the BD MAX system using Real-Time PCR and  fluorogenic target-specific hybridization.    C Diff by PCR Result: Detected              RADIOLOGY:    < from: CT Head No Cont (20 @ 13:01) >    IMPRESSION:    Left greater than right small bifrontal CSF extra-axial density which may represent prominent subarachnoid spaces, subdural hygromas or chronic subdural hematomas.    Otherwise unremarkable exam.    < end of copied text >          < from: CT Chest w/ IV Cont (20 @ 06:03) >  IMPRESSION:      Status post median sternotomy with disunion of the sternum and dehiscence of the sternal wires, new since prior x-ray 2016. No pneumothorax. No pneumomediastinum.     Moderate upper lobe predominant centrilobular emphysema. No evidence of pneumonia.    Prominent gallbladder wall thickening in the body and fundus of the gallbladder up to 1 cm, which may be related to collapse, possibly adenomyomatosis; consider nonemergent contrast enhanced MRI for further evaluation as indicated.    < end of copied text >        < from: CT Abdomen and Pelvis w/ IV Cont (20 @ 06:03) >  EXAM:  CT ABDOMEN AND PELVIS IC                          EXAM:  CT CHEST IC                            PROCEDURE DATE:  2020            INTERPRETATION:  CLINICAL INFORMATION: Sepsis and abdominal pain.    COMPARISON: Chest x-ray from earlier the same day at 2016..    PROCEDURE:   CT of the Chest, Abdomen and Pelvis was performed with intravenous contrast.   Intravenous contrast: 90 ml Omnipaque 350. 10 ml discarded.  Oral contrast: None.  Sagittal and coronal reformats were performed.    FINDINGS:    CHEST:     LUNGS AND LARGE AIRWAYS: Patent central airways. Moderate upper lobe predominant centrilobular emphysema. 2 mm subpleural nodule in the left lower lobe (2:71).  PLEURA: No pleural effusion. No pneumothorax.  VESSELS: Atherosclerosis.  HEART: Heart size is normal. No pericardial effusion. Left-sided AICD with leads terminating in the right atrium and right ventricle. Coronary artery calcifications.  MEDIASTINUM AND REYNOLD: Borderline enlarged 1.0 cm in short axis right paratracheal lymph node.  CHEST WALL AND LOWER NECK: Dehiscence of sternotomy wires, new since prior x-ray 2016. Disunion of the sternum.    ABDOMEN AND PELVIS:    LIVER: Liver cysts and subcentimeter hypodensities too small to characterize.  BILE DUCTS: Common bile duct is collapsed and measures 2 mm.  GALLBLADDER: The gallbladder is collapsed. There is prominent gallbladder wall thickening in the body and fundus of the gallbladder up to 1 cm. Multiple punctate gallstones in the neck of the gallbladder.  SPLEEN: Within normal limits.  PANCREAS: Within normal limits.  ADRENALS: Within normal limits.  KIDNEYS/URETERS: Bilateral renal cysts.    BLADDER: Within normal limits.  REPRODUCTIVE ORGANS: Prostate is mildly enlarged.    BOWEL: Smallhiatal hernia. Colonic diverticulosis without acute diverticulitis. No bowel obstruction. Appendix is normal.  PERITONEUM: No ascites.  VESSELS: Atherosclerotic changes.  RETROPERITONEUM/LYMPH NODES: No lymphadenopathy.    ABDOMINAL WALL: Within normal limits.  BONES: Degenerative changes.    IMPRESSION:      Status post median sternotomy with disunion of the sternum and dehiscence of the sternal wires, new since prior x-ray 2016. No pneumothorax. No pneumomediastinum.     Moderate upper lobe predominant centrilobular emphysema. No evidence of pneumonia.    Prominent gallbladder wall thickening in the body and fundus of the gallbladder up to 1 cm, which may be related to collapse, possibly adenomyomatosis; consider nonemergent contrast enhanced MRI for further evaluation as indicated.    < end of copied text >            < from: Xray Chest 1 View AP/PA (20 @ 05:26) >      IMPRESSION:     Clear lungs.  Dehiscence of multiple sternal wires, new since 2016. Follow-up already ordered chest CT to further evaluate.    < end of copied text >

## 2020-03-23 NOTE — PROGRESS NOTE ADULT - SUBJECTIVE AND OBJECTIVE BOX
INTERVAL HPI/OVERNIGHT EVENTS:    patient seen and examined earlier this morning  events noted  C.diff +  reports 2 watery bms this morning  reports minimal appetite  denies fever, chills, n/v abd pain     MEDICATIONS  (STANDING):  aspirin enteric coated 81 milliGRAM(s) Oral daily  atorvastatin 80 milliGRAM(s) Oral at bedtime  clopidogrel Tablet 75 milliGRAM(s) Oral daily  heparin  Injectable 5000 Unit(s) SubCutaneous every 12 hours  lactobacillus acidophilus 1 Tablet(s) Oral three times a day  sodium chloride 0.9%. 1000 milliLiter(s) (70 mL/Hr) IV Continuous <Continuous>  vancomycin    Solution 125 milliGRAM(s) Oral every 6 hours    MEDICATIONS  (PRN):      Allergies    No Known Allergies    Intolerances        Review of Systems:    General:  No wt loss, fevers, chills, night sweats,fatigue,   Eyes:  Good vision, no reported pain  ENT:  No sore throat, pain, runny nose, dysphagia  CV:  No pain, palpitatioins, hypo/hypertension  Resp:  No dyspnea, cough, tachypnea, wheezing  GI:  No pain, No nausea, No vomiting, +diarrhea, No constipation, No weight loss, No fever, No pruritis, No rectal bleeding, No tarry stools, No dysphagia,  :  No pain, bleeding, incontinence, nocturia  Muscle:  No pain, weakness  Neuro:  No weakness, tingling, memory problems  Psych:  No fatigue, insomnia, mood problems, depression  Endocrine:  No polyuria, polydypsia, cold/heat intolerance  Heme:  No petechiae, ecchymosis, easy bruisability  Skin:  No rash, tattoos, scars, edema      Vital Signs Last 24 Hrs  T(C): 37.3 (23 Mar 2020 04:51), Max: 38.1 (22 Mar 2020 20:34)  T(F): 99.2 (23 Mar 2020 04:51), Max: 100.5 (22 Mar 2020 20:34)  HR: 93 (23 Mar 2020 04:51) (80 - 93)  BP: 98/62 (23 Mar 2020 04:51) (98/62 - 107/68)  BP(mean): --  RR: 18 (23 Mar 2020 04:51) (17 - 18)  SpO2: 91% (23 Mar 2020 04:51) (91% - 96%)    PHYSICAL EXAM:    Constitutional: NAD  HEENT: EOMI, throat clear  Neck: No LAD, supple  Respiratory: CTA and P  Cardiovascular: S1 and S2, RRR, no M  Gastrointestinal: BS+, soft, NT/ND  Extremities: No peripheral edema  Vascular: 2+ peripheral pulses  Neurological: A/O x 3, no focal deficits        LABS:                        16.5   6.99  )-----------( 147      ( 23 Mar 2020 10:10 )             51.6     03-    132<L>  |  98  |  21  ----------------------------<  119<H>  4.5   |  20<L>  |  1.14    Ca    8.6      23 Mar 2020 10:10    TPro  6.7  /  Alb  3.6  /  TBili  1.0  /  DBili  x   /  AST  36  /  ALT  39  /  AlkPhos  31<L>  03-22    PT/INR - ( 22 Mar 2020 04:22 )   PT: 13.3 sec;   INR: 1.16 ratio         PTT - ( 22 Mar 2020 04:22 )  PTT:34.5 sec  Urinalysis Basic - ( 22 Mar 2020 08:12 )    Color: Yellow / Appearance: Clear / S.041 / pH: x  Gluc: x / Ketone: Negative  / Bili: Negative / Urobili: Negative   Blood: x / Protein: Trace / Nitrite: Negative   Leuk Esterase: Negative / RBC: 1 /hpf / WBC 1 /HPF   Sq Epi: x / Non Sq Epi: 1 /hpf / Bacteria: Negative        RADIOLOGY & ADDITIONAL TESTS:

## 2020-03-23 NOTE — PHARMACOTHERAPY INTERVENTION NOTE - COMMENTS
Confirmed home medications with patient (via telephone) and pharmacy, updated in Outpatient Medication Review.     Daxa Rogers, PharmD   [phone] Confirmed home medications with patient (via telephone) and pharmacy, updated in Outpatient Medication Review. Communicated with NP.    Daxa Rogers, PharmD   [phone]

## 2020-03-23 NOTE — CONSULT NOTE ADULT - CONSULT REASON
syncope   h/o  CAD, S/P CABG 2016, MI/  STEMI 2016, s/p  cardiogenic shock , s/p AICD, mod-sev MR, Systolic CHF,

## 2020-03-23 NOTE — CONSULT NOTE ADULT - ASSESSMENT
echo 11/20/16 EF 20%, sev global LV dysfx. mod pulm htn, mod to severe MR   Cath 11/18/16 with patent grafts     64 M   h/o  CAD, S/P CABG 2016, MI/  STEMI 2016, s/p cardiogenic shock , s/p AICD, mod-sev MR, Systolic CHF,  presenting with here for 2 episode of syncope and diarrhea + cdiff    1. Syncope   secondary to hypovolemia in the setting of diarrhea/ use of diuretics, anti-htn meds/ decrease po intake   + orthostatics hypotension   continue to hold all diuretics and anti-htn meds  check echo to eval MR, lv  c/w midodrine 2.5 mg TID  no acs   IVF, monitor for fluid overload   ep to interrogate AICD   CT head noted no acute findings, ?subarachnoid spaces, subdural hygromas or chronic subdural hematomas.    2.  CAD, S/P CABG 2016  cv stable c/w asa, plavix , statin      3. Chronic systolic CHF s/p AICD  no evidence of fluid overload on exam   continue to hold all diuretics/ lv dysfx meds secondary to hypotension   echo     4. CDIFF  management per ID/GI    dvt ppx echo 11/20/16 EF 20%, sev global LV dysfx. mod pulm htn, mod to severe MR   Cath 11/18/16 with patent grafts     64 M   h/o  CAD, S/P CABG 2016, MI/  STEMI 2016, s/p cardiogenic shock , s/p AICD, mod-sev MR, Systolic CHF,  presenting with here for 2 episode of syncope and diarrhea + cdiff    1. Syncope   secondary to hypovolemia in the setting of diarrhea/ use of diuretics, anti-htn meds/ decrease po intake   + orthostatics hypotension   continue to hold all diuretics and anti-htn meds  check echo to eval MR, lv  c/w midodrine 2.5 mg TID  trop neg  IVF, monitor for fluid overload   ep to interrogate AICD   CT head noted no acute findings, ?subarachnoid spaces, subdural hygromas or chronic subdural hematomas.  check EKG     2.  CAD, S/P CABG 2016  cv stable c/w asa, plavix , statin      3. Chronic systolic CHF s/p AICD  no evidence of fluid overload on exam   continue to hold all diuretics/ lv dysfx meds secondary to hypotension   echo     4. CDIFF  management per ID/GI    dvt ppx echo 11/20/16 EF 20%, sev global LV dysfx. mod pulm htn, mod to severe MR   Cath 11/18/16 with patent grafts     64 M   h/o  CAD, S/P CABG 2016, MI/  STEMI 2016, s/p cardiogenic shock , s/p AICD, mod-sev MR, Systolic CHF,  presenting with here for 2 episode of syncope and diarrhea + cdiff    1. Syncope   secondary to hypovolemia in the setting of diarrhea/ use of diuretics, anti-htn meds/ decrease po intake   + orthostatic hypotension   continue to hold all diuretics and anti-htn meds  check echo to eval MR, lv  c/w midodrine 2.5 mg TID  trop neg  IVF, monitor for fluid overload   ep to interrogate AICD   CT head noted no acute findings, ?subarachnoid spaces, subdural hygromas or chronic subdural hematomas.  check EKG     2.  CAD, S/P CABG 2016  cv stable c/w asa, plavix , statin      3. Chronic systolic CHF s/p AICD  no evidence of fluid overload on exam   continue to hold all diuretics/ lv dysfx meds secondary to hypotension   echo     4. CDIFF  management per ID/GI    dvt ppx

## 2020-03-24 LAB
BASOPHILS # BLD AUTO: 0.01 K/UL — SIGNIFICANT CHANGE UP (ref 0–0.2)
BASOPHILS NFR BLD AUTO: 0.2 % — SIGNIFICANT CHANGE UP (ref 0–2)
CULTURE RESULTS: SIGNIFICANT CHANGE UP
EOSINOPHIL # BLD AUTO: 0.01 K/UL — SIGNIFICANT CHANGE UP (ref 0–0.5)
EOSINOPHIL NFR BLD AUTO: 0.2 % — SIGNIFICANT CHANGE UP (ref 0–6)
HCT VFR BLD CALC: 43.6 % — SIGNIFICANT CHANGE UP (ref 39–50)
HGB BLD-MCNC: 14.5 G/DL — SIGNIFICANT CHANGE UP (ref 13–17)
IMM GRANULOCYTES NFR BLD AUTO: 0.2 % — SIGNIFICANT CHANGE UP (ref 0–1.5)
LYMPHOCYTES # BLD AUTO: 0.62 K/UL — LOW (ref 1–3.3)
LYMPHOCYTES # BLD AUTO: 12 % — LOW (ref 13–44)
MCHC RBC-ENTMCNC: 29.4 PG — SIGNIFICANT CHANGE UP (ref 27–34)
MCHC RBC-ENTMCNC: 33.3 GM/DL — SIGNIFICANT CHANGE UP (ref 32–36)
MCV RBC AUTO: 88.3 FL — SIGNIFICANT CHANGE UP (ref 80–100)
MONOCYTES # BLD AUTO: 0.39 K/UL — SIGNIFICANT CHANGE UP (ref 0–0.9)
MONOCYTES NFR BLD AUTO: 7.5 % — SIGNIFICANT CHANGE UP (ref 2–14)
NEUTROPHILS # BLD AUTO: 4.13 K/UL — SIGNIFICANT CHANGE UP (ref 1.8–7.4)
NEUTROPHILS NFR BLD AUTO: 79.9 % — HIGH (ref 43–77)
NRBC # BLD: 0 /100 WBCS — SIGNIFICANT CHANGE UP (ref 0–0)
PLATELET # BLD AUTO: 102 K/UL — LOW (ref 150–400)
RBC # BLD: 4.94 M/UL — SIGNIFICANT CHANGE UP (ref 4.2–5.8)
RBC # FLD: 15.9 % — HIGH (ref 10.3–14.5)
SPECIMEN SOURCE: SIGNIFICANT CHANGE UP
WBC # BLD: 5.17 K/UL — SIGNIFICANT CHANGE UP (ref 3.8–10.5)
WBC # FLD AUTO: 5.17 K/UL — SIGNIFICANT CHANGE UP (ref 3.8–10.5)

## 2020-03-24 PROCEDURE — 71045 X-RAY EXAM CHEST 1 VIEW: CPT | Mod: 26

## 2020-03-24 PROCEDURE — 99232 SBSQ HOSP IP/OBS MODERATE 35: CPT

## 2020-03-24 PROCEDURE — 93010 ELECTROCARDIOGRAM REPORT: CPT

## 2020-03-24 RX ORDER — FUROSEMIDE 40 MG
20 TABLET ORAL ONCE
Refills: 0 | Status: COMPLETED | OUTPATIENT
Start: 2020-03-24 | End: 2020-03-24

## 2020-03-24 RX ADMIN — Medication 125 MILLIGRAM(S): at 23:36

## 2020-03-24 RX ADMIN — CLOPIDOGREL BISULFATE 75 MILLIGRAM(S): 75 TABLET, FILM COATED ORAL at 13:18

## 2020-03-24 RX ADMIN — Medication 125 MILLIGRAM(S): at 13:17

## 2020-03-24 RX ADMIN — MIDODRINE HYDROCHLORIDE 2.5 MILLIGRAM(S): 2.5 TABLET ORAL at 13:19

## 2020-03-24 RX ADMIN — Medication 1 TABLET(S): at 21:48

## 2020-03-24 RX ADMIN — Medication 1 TABLET(S): at 13:17

## 2020-03-24 RX ADMIN — HEPARIN SODIUM 5000 UNIT(S): 5000 INJECTION INTRAVENOUS; SUBCUTANEOUS at 05:09

## 2020-03-24 RX ADMIN — Medication 1 TABLET(S): at 05:09

## 2020-03-24 RX ADMIN — Medication 20 MILLIGRAM(S): at 21:48

## 2020-03-24 RX ADMIN — MIDODRINE HYDROCHLORIDE 2.5 MILLIGRAM(S): 2.5 TABLET ORAL at 05:09

## 2020-03-24 RX ADMIN — MIDODRINE HYDROCHLORIDE 2.5 MILLIGRAM(S): 2.5 TABLET ORAL at 17:21

## 2020-03-24 RX ADMIN — Medication 125 MILLIGRAM(S): at 17:20

## 2020-03-24 RX ADMIN — HEPARIN SODIUM 5000 UNIT(S): 5000 INJECTION INTRAVENOUS; SUBCUTANEOUS at 17:21

## 2020-03-24 RX ADMIN — Medication 81 MILLIGRAM(S): at 13:17

## 2020-03-24 RX ADMIN — Medication 125 MILLIGRAM(S): at 05:09

## 2020-03-24 RX ADMIN — ATORVASTATIN CALCIUM 80 MILLIGRAM(S): 80 TABLET, FILM COATED ORAL at 21:48

## 2020-03-24 NOTE — PROGRESS NOTE ADULT - SUBJECTIVE AND OBJECTIVE BOX
diarrhea/ less/  afebrile  REVIEW OF SYSTEMS:  GEN: no fever,    no chills  RESP: no SOB,   no cough  CVS: no chest pain,   no palpitations  GI: no abdominal pain,   no nausea,   no vomiting,   no constipation,   no diarrhea  : no dysuria,   no frequency  NEURO: no headache,   no dizziness  PSYCH: no depression,   not anxious  Derm : no rash    MEDICATIONS  (STANDING):  aspirin enteric coated 81 milliGRAM(s) Oral daily  atorvastatin 80 milliGRAM(s) Oral at bedtime  clopidogrel Tablet 75 milliGRAM(s) Oral daily  heparin  Injectable 5000 Unit(s) SubCutaneous every 12 hours  lactobacillus acidophilus 1 Tablet(s) Oral three times a day  midodrine. 2.5 milliGRAM(s) Oral three times a day  sodium chloride 0.9%. 1000 milliLiter(s) (70 mL/Hr) IV Continuous <Continuous>  vancomycin    Solution 125 milliGRAM(s) Oral every 6 hours    MEDICATIONS  (PRN):      Vital Signs Last 24 Hrs  T(C): 36.8 (24 Mar 2020 04:46), Max: 38.8 (23 Mar 2020 20:54)  T(F): 98.3 (24 Mar 2020 04:46), Max: 101.9 (23 Mar 2020 20:54)  HR: 97 (24 Mar 2020 04:46) (90 - 97)  BP: 102/68 (24 Mar 2020 04:46) (100/59 - 107/68)  BP(mean): --  RR: 18 (24 Mar 2020 04:46) (18 - 18)  SpO2: 94% (24 Mar 2020 04:46) (91% - 95%)  CAPILLARY BLOOD GLUCOSE        I&O's Summary    23 Mar 2020 07:01  -  24 Mar 2020 07:00  --------------------------------------------------------  IN: 300 mL / OUT: 200 mL / NET: 100 mL        PHYSICAL EXAM:  HEAD:  Atraumatic, Normocephalic  NECK: Supple, No   JVD  CHEST/LUNG:   no     rales,     no,    rhonchi  HEART: Regular rate and rhythm;         murmur  ABDOMEN: Soft, Nontender, ;   EXTREMITIES:   no     edema  NEUROLOGY:  alert    LABS:                        14.5   5.17  )-----------( 102      ( 24 Mar 2020 06:47 )             43.6     03-23    132<L>  |  98  |  21  ----------------------------<  119<H>  4.5   |  20<L>  |  1.14    Ca    8.6      23 Mar 2020 10:10              Lactate, Blood: 2.6 mmol/L (03-23 @ 23:12)                  Consultant(s) Notes Reviewed:      Care Discussed with Consultants/Other Providers:

## 2020-03-24 NOTE — PROGRESS NOTE ADULT - ASSESSMENT
diarrhea  cad  s/p aicd    C.diff +  ID eval noted  cont vanco 125mg po q6  cont Bacid TID   diet as tolerated  supportive care  monitor electrolytes, replete prn   monitor stool output   ct reviewed, slightly thickening gb possible adenomyomatosis  unable to get mri 2/2 aicd  monitor with u/s every 3-6 months  no objection to cont dual antiplatlet therapy  cardiology eval noted; diuretics on hold 2/2 hypotension

## 2020-03-24 NOTE — CHART NOTE - NSCHARTNOTEFT_GEN_A_CORE
Called by RN to evaluate patient for labored breathing. Pt seen and examined at bedside. He reports SOB and increase work of breathing, pt denies CP, palpitations, dizziness,  or lightheadedness. On exam no crackles or wheezing noted. Pt admitted with syncope likely 2/2 dehydration in setting of dehydration. Pt also noted to be orthostatic and currently on IVF. IVF stopped, CXR STAT, and placed on supplemental oxygen. Plan d/w Dr. Bueno, continue to monitor and await CXR results.     Vital Signs Last 24 Hrs  T(C): 36.7 (24 Mar 2020 14:40), Max: 38.8 (23 Mar 2020 20:54)  T(F): 98.1 (24 Mar 2020 14:40), Max: 101.9 (23 Mar 2020 20:54)  HR: 65 (24 Mar 2020 14:40) (65 - 97)  BP: 105/73 (24 Mar 2020 14:40) (100/59 - 105/73)  RR: 18 (24 Mar 2020 14:40) (18 - 18)  SpO2: 90% (24 Mar 2020 14:40) (90% - 94%)    Larissa Leal NP  [id number]

## 2020-03-24 NOTE — PROGRESS NOTE ADULT - ASSESSMENT
echo 11/20/16 EF 20%, sev global LV dysfx. mod pulm htn, mod to severe MR   Cath 11/18/16 with patent grafts     64 M   h/o  CAD, S/P CABG 2016, MI/  STEMI 2016, s/p cardiogenic shock , s/p AICD, mod-sev MR, Systolic CHF,  presenting with here for 2 episode of syncope and diarrhea + cdiff    1. Syncope   secondary to hypovolemia in the setting of diarrhea/ use of diuretics, anti-htn meds/ decrease po intake   + orthostatic hypotension   continue to hold all diuretics and anti-htn meds  check echo to eval MR, lv   c/w midodrine 2.5 mg TID  trop neg  IVF, monitor for fluid overload   ep to interrogate AICD   CT head noted no acute findings, ?subarachnoid spaces, subdural hygromas or chronic subdural hematomas.  check EKG     2.  CAD, S/P CABG 2016  cv stable c/w asa, plavix , statin      3. Chronic systolic CHF s/p AICD  no evidence of fluid overload on exam   continue to hold all diuretics/ lv dysfx meds secondary to hypotension   echo     4. CDIFF  management per ID/GI    dvt ppx

## 2020-03-24 NOTE — PROGRESS NOTE ADULT - ASSESSMENT
64 M   h/o   MI/  STEMI 2016 .  s/p    cardiogenic shock ,    s/p AICD placement    here for 2 episode of syncope   in the setting of diarrhea x 2 days.     Has been having 3 episodes of profuse loose stools. No recent abx use.     Has been following strict fluid control prescribed by Dr Vincent.    Had two episodes of passing out, each time while he was standing up from a lying down position. Here because he passed out second time . Both times were on his bed, no injuries. No fevers, chills.        Cards: Jhony Thomas MD  PCP: Raul Harris MD   Aspirin 81 MG TABS  Atorvastatin Calcium 80 MG Oral Tablet; Take 1 tablet daily  Carvedilol 6.25 MG Oral Tablet; Take 1 tablet twice daily  Entresto  MG Oral Tablet; TAKE 1 TABLET BY MOUTH TWICE A DAY  Ezetimibe 10 MG Oral Tablet; Take 1 tablet daily  Lasix 20 MG Oral Tablet; TAKE 1 TABLET EVERY OTHER DAY  metFORMIN HCl - 500 MG Oral Tablet; Take 1 tablet twice daily  Plavix 75 MG Oral Tablet  Spironolactone 25 MG Oral Tablet; TAKE 1 TABLET DAILY  Tudorza Pressair 400 MCG/ACT Inhalation Aerosol Powder Breath Activated; 1 puff BID as directed (22 Mar 2020 10:40)    ER vitals:  T 98, P 98, BP 74/48.  WBC 11.9.  Cdiff PCR (+).  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 No pneumothorax. No pneumomediastinum.  Moderate upper lobe predominant centrilobular emphysema. No evidence of pneumonia. Prominent gallbladder wall thickening in the body and fundus of the gallbladder up to 1 cm, which may be related to collapse, possibly adenomyomatosis.   Pt on oral vanco for Cdiff trt.  ID consult called for further managment.       C.diff:    - Pt with profuse watery diarrhea, p/w syncope, low BP, elevated WBC.  Agree with vanco 125mg PO q6.    - Cont supportive care, IVF hydration, replete electrolytes.      - Monitor serial abdominal exam, stool output and consistency.  If pt not improving, consider adding adjunctive therapy (i.e. cholestyramine). Diarrhea better today.    - Pt with fever 101.9 last night.  Cont to monitor - possibly due to C.diff.   If spikes fever again, send blood cultures and full sepsis w/u including Covid-19.  Pt states he has a chronic cough due to smoking, has remained at home for all of last week. CT chest with predominant upper lobe emphysema.   Pt currently without wheezing.  On nasal cannula for mild sob.  Repeat cxr ordered.   Pt also received IVF, lasix on hold.  Monitor for fluid overload.     - Maintain contact precautions.       Will follow,    Demi Gallegos  [phone]

## 2020-03-24 NOTE — PROGRESS NOTE ADULT - SUBJECTIVE AND OBJECTIVE BOX
CARDIOLOGY FOLLOW UP - Dr. Suh    CC no cp or sob   + fever/ diarrhea       PHYSICAL EXAM:  T(C): 36.8 (03-24-20 @ 04:46), Max: 38.8 (03-23-20 @ 20:54)  HR: 97 (03-24-20 @ 04:46) (90 - 97)  BP: 102/68 (03-24-20 @ 04:46) (100/59 - 107/68)  RR: 18 (03-24-20 @ 04:46) (18 - 18)  SpO2: 94% (03-24-20 @ 04:46) (91% - 95%)  Wt(kg): --  I&O's Summary    23 Mar 2020 07:01  -  24 Mar 2020 07:00  --------------------------------------------------------  IN: 300 mL / OUT: 200 mL / NET: 100 mL        Appearance: Normal	  Cardiovascular: Normal S1 S2,RRR +murmur  Respiratory: Lungs clear to auscultation	  Gastrointestinal:  Soft, Non-tender, + BS	  Extremities: Normal range of motion, No clubbing, cyanosis or edema        MEDICATIONS  (STANDING):  aspirin enteric coated 81 milliGRAM(s) Oral daily  atorvastatin 80 milliGRAM(s) Oral at bedtime  clopidogrel Tablet 75 milliGRAM(s) Oral daily  heparin  Injectable 5000 Unit(s) SubCutaneous every 12 hours  lactobacillus acidophilus 1 Tablet(s) Oral three times a day  midodrine. 2.5 milliGRAM(s) Oral three times a day  sodium chloride 0.9%. 1000 milliLiter(s) (70 mL/Hr) IV Continuous <Continuous>  vancomycin    Solution 125 milliGRAM(s) Oral every 6 hours      TELEMETRY: nsr 	    ECG:  	  RADIOLOGY:   DIAGNOSTIC TESTING:  [ ] Echocardiogram:  [ ]  Catheterization:  [ ] Stress Test:    OTHER: 	    LABS:	 	    Troponin T, High Sensitivity Result: 21 ng/L [0 - 51] (03-22 @ 04:22)                          14.5   5.17  )-----------( 102      ( 24 Mar 2020 06:47 )             43.6     03-23    132<L>  |  98  |  21  ----------------------------<  119<H>  4.5   |  20<L>  |  1.14    Ca    8.6      23 Mar 2020 10:10

## 2020-03-24 NOTE — PROGRESS NOTE ADULT - SUBJECTIVE AND OBJECTIVE BOX
Infectious Diseases progress note:    Subjective: Events noted.  Pt with fever last night.  No new fever today.  Diarrhea is better, WBC normalized.  No abd pain/n/v.  Noted sob today, cxr ordered.  States cough is at baseline.  On nasal cannula.     ROS:  CONSTITUTIONAL:  No fever, chills, rigors  CARDIOVASCULAR:  No chest pain or palpitations  RESPIRATORY:   No SOB, cough, dyspnea on exertion.  No wheezing  GASTROINTESTINAL:  No abd pain, N/V, diarrhea/constipation  EXTREMITIES:  No swelling or joint pain  GENITOURINARY:  No burning on urination, increased frequency or urgency.  No flank pain  NEUROLOGIC:  No HA, visual disturbances  SKIN: No rashes    Allergies    No Known Allergies    Intolerances        ANTIBIOTICS/RELEVANT:  antimicrobials  vancomycin    Solution 125 milliGRAM(s) Oral every 6 hours    immunologic:    OTHER:  aspirin enteric coated 81 milliGRAM(s) Oral daily  atorvastatin 80 milliGRAM(s) Oral at bedtime  clopidogrel Tablet 75 milliGRAM(s) Oral daily  heparin  Injectable 5000 Unit(s) SubCutaneous every 12 hours  lactobacillus acidophilus 1 Tablet(s) Oral three times a day  midodrine. 2.5 milliGRAM(s) Oral three times a day      Objective:  Vital Signs Last 24 Hrs  T(C): 36.7 (24 Mar 2020 14:40), Max: 38.8 (23 Mar 2020 20:54)  T(F): 98.1 (24 Mar 2020 14:40), Max: 101.9 (23 Mar 2020 20:54)  HR: 65 (24 Mar 2020 14:40) (65 - 97)  BP: 105/73 (24 Mar 2020 14:40) (100/59 - 105/73)  BP(mean): --  RR: 18 (24 Mar 2020 14:40) (18 - 18)  SpO2: 90% (24 Mar 2020 14:40) (90% - 94%)    PHYSICAL EXAM:  Constitutional:  appears mildly tachypneic  Eyes:JOSS, EOMI  Ear/Nose/Throat: no thrush, mucositis.  Moist mucous membranes	  Neck:no JVD, no lymphadenopathy, supple  Respiratory: CTA charles, no wheezing  Cardiovascular: S1S2 RRR, no murmurs  Gastrointestinal:soft, nontender,  nondistended (+) BS  Extremities:no e/e/c  Skin:  no rashes, open wounds or ulcerations        LABS:                        14.5   5.17  )-----------( 102      ( 24 Mar 2020 06:47 )             43.6     03-23    132<L>  |  98  |  21  ----------------------------<  119<H>  4.5   |  20<L>  |  1.14    Ca    8.6      23 Mar 2020 10:10            Procalcitonin, Serum: 0.12 (03-23 @ 23:12)                    MICROBIOLOGY:      Culture - Stool (03.22.20 @ 18:23)    Specimen Source: .Stool Feces    Culture Results:   No enteric pathogens isolated.  (Stool culture examined for Salmonella,  Shigella, Campylobacter, Aeromonas, Plesiomonas,  Vibrio, E.coli O157 and Yersinia)    Culture - Urine (03.22.20 @ 12:31)    Specimen Source: .Urine Clean Catch (Midstream)    Culture Results:   No growth        RADIOLOGY & ADDITIONAL STUDIES:    < from: CT Head No Cont (03.22.20 @ 13:01) >  EXAM:  CT BRAIN                            PROCEDURE DATE:  03/22/2020            INTERPRETATION:  INDICATIONS:  syncope  .    TECHNIQUE:  Serial axial images were obtained from the skull base to the vertex without intravenous contrast. Coronal andsagittal reformatted images were obtained.    COMPARISON EXAMINATION: None.    FINDINGS: Intravascular contrast is seen secondary to recent chest and abdomen CT scan.  VENTRICLES AND SULCI:  Prominent in size compatible with age-appropriate volume loss    INTRA-AXIAL:  No mass, blood or abnormal attenuation is seen.    EXTRA-AXIAL:  Bifrontal prominent extra-axial CSF density is seen more prominent on the left where measurement is 5 mm. No significant mass effect or midline shift is seen.    VISUALIZED SINUSES:  Mild left maxillary mucosal thickening    VISUALIZED MASTOIDS:  Clear.    CALVARIUM:  Normal.    MISCELLANEOUS:  None.      IMPRESSION:    Left greater than right small bifrontal CSF extra-axial density which may represent prominent subarachnoid spaces, subdural hygromas or chronic subdural hematomas.    Otherwise unremarkable exam.    < end of copied text >

## 2020-03-24 NOTE — PROGRESS NOTE ADULT - ASSESSMENT
: 64 M    h/o   MI/ stemi  2016 .  s/p    cardiogenic shock ,   CABG.  HLD.   s/p AICD placement  h/o low  EF, OF  20,   severe  MR. mod  pulm htn. in 2016  COPD., obesity       here for 2 episode of syncope   in the setting of diarrhea x 2 days.      Has been having 3 episodes of profuse loose stools. No recent abx use.     Had two episodes of passing out, each time while he was standing up from a lying down   tele.  nsr/  ct head, no bleed/ ?  c/c  hygroma vs subdural  hematomas    orthostatic   low  sbp.  hold  aldactone/ lasix  and  coreg/  lisinopril   for now.    given low   sbp    on asa/ plavix/  statin/    gentle  hydration    c  diff toxin. positive/ on po  vanco/       ct chest/ abdomen,    sternal  dehiscence /  emphysema/ GB  adenomyosis  gi  eval/  ID eval. dr Gallegos/  sbp  in low  100s  now/  on midodrine  diarrhea  still, but less   dvt ppx     < fom: CT Head No Cont (03.22.20 @ 13:01) >  IMPRESSION:    Left greater than right small bifrontal CSF extra-axial density which may represent prominent subarachnoid spaces, subdural hygromas or chronic subdural hematomas.    Otherwise unremarkable exam.  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 No pneumothorax. No pneumomediastinum.   Moderate upper lobe predominant centrilobular emphysema. No evidence of pneumonia.  Prominent gallbladder wall thickening in the body and fundus of the gallbladder up to 1 cm, which may be related to collapse, possibly adenomyomatosis; consider nonemergent contrast enhanced MRI for further evaluation as indicated.  < end of copied text >       < from: Transthoracic Echocardiogram (11.20.16 @ 18:09) >  1. Mitral annular calcification. Tethered mitral valve  leaflets with normal opening. Moderate-severe mitral  regurgitation.  2. Eccentric left ventricular hypertrophy (dilated left  ventricle with normal relative wall thickness).  3. Endocardial visualization enhanced with intravenous  injection of echo contrast (Definity). Severe global left  ventricular systolic dysfunction.  4. Normal right ventricular size with decreased right  ventricular systolic function.  5. Estimated right ventricular systolic pressure equals 58  mm Hg, assuming right atrial pressure equals 8 mm Hg,  < end of copied text > : 64 M    h/o   MI/ stemi  2016 .  s/p    cardiogenic shock ,   CABG.  HLD.   s/p AICD placement  h/o low  EF, OF  20,   severe  MR. mod  pulm htn. in 2016  COPD., obesity       here for 2 episode of syncope   in the setting of diarrhea x 2 days.      Has been having 3 episodes of profuse loose stools. No recent abx use.     Had two episodes of passing out, each time while he was standing up from a lying down   tele.  nsr/  ct head, no bleed/ ?  c/c  hygroma vs subdural  hematomas    orthostatic   low  sbp.  hold  aldactone/ lasix  and  coreg/  lisinopril   for now.    given low   sbp    on asa/ plavix/  statin/    gentle  hydration    c  diff toxin. positive/ on po  vanco/       ct chest/ abdomen,    sternal  dehiscence /  emphysema/ GB  adenomyosis  gi  eval/  ID eval. dr Gallegos/  sbp  in low  100s  now/  on midodrine  diarrhea  still, but less  follow  platelet  count   dvt ppx     < fom: CT Head No Cont (03.22.20 @ 13:01) >  IMPRESSION:    Left greater than right small bifrontal CSF extra-axial density which may represent prominent subarachnoid spaces, subdural hygromas or chronic subdural hematomas.    Otherwise unremarkable exam.  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 No pneumothorax. No pneumomediastinum.   Moderate upper lobe predominant centrilobular emphysema. No evidence of pneumonia.  Prominent gallbladder wall thickening in the body and fundus of the gallbladder up to 1 cm, which may be related to collapse, possibly adenomyomatosis; consider nonemergent contrast enhanced MRI for further evaluation as indicated.  < end of copied text >       < from: Transthoracic Echocardiogram (11.20.16 @ 18:09) >  1. Mitral annular calcification. Tethered mitral valve  leaflets with normal opening. Moderate-severe mitral  regurgitation.  2. Eccentric left ventricular hypertrophy (dilated left  ventricle with normal relative wall thickness).  3. Endocardial visualization enhanced with intravenous  injection of echo contrast (Definity). Severe global left  ventricular systolic dysfunction.  4. Normal right ventricular size with decreased right  ventricular systolic function.  5. Estimated right ventricular systolic pressure equals 58  mm Hg, assuming right atrial pressure equals 8 mm Hg,  < end of copied text >

## 2020-03-24 NOTE — PROGRESS NOTE ADULT - SUBJECTIVE AND OBJECTIVE BOX
INTERVAL HPI/OVERNIGHT EVENTS:    patient seen and examined earlier this morning  events noted; tmax 101.9F overnight  reports 2 watery bms this morning  tolerating PO  denies fever, chills, n/v abd pain     MEDICATIONS  (STANDING):  aspirin enteric coated 81 milliGRAM(s) Oral daily  atorvastatin 80 milliGRAM(s) Oral at bedtime  clopidogrel Tablet 75 milliGRAM(s) Oral daily  heparin  Injectable 5000 Unit(s) SubCutaneous every 12 hours  lactobacillus acidophilus 1 Tablet(s) Oral three times a day  sodium chloride 0.9%. 1000 milliLiter(s) (70 mL/Hr) IV Continuous <Continuous>  vancomycin    Solution 125 milliGRAM(s) Oral every 6 hours    MEDICATIONS  (PRN):      Allergies    No Known Allergies    Intolerances        Review of Systems:    General:  No wt loss, fevers, chills, night sweats,fatigue,   Eyes:  Good vision, no reported pain  ENT:  No sore throat, pain, runny nose, dysphagia  CV:  No pain, palpitatioins, hypo/hypertension  Resp:  No dyspnea, cough, tachypnea, wheezing  GI:  No pain, No nausea, No vomiting, +diarrhea, No constipation, No weight loss, No fever, No pruritis, No rectal bleeding, No tarry stools, No dysphagia,  :  No pain, bleeding, incontinence, nocturia  Muscle:  No pain, weakness  Neuro:  No weakness, tingling, memory problems  Psych:  No fatigue, insomnia, mood problems, depression  Endocrine:  No polyuria, polydypsia, cold/heat intolerance  Heme:  No petechiae, ecchymosis, easy bruisability  Skin:  No rash, tattoos, scars, edema      Vital Signs Last 24 Hrs  T(C): 37.3 (23 Mar 2020 04:51), Max: 38.1 (22 Mar 2020 20:34)  T(F): 99.2 (23 Mar 2020 04:51), Max: 100.5 (22 Mar 2020 20:34)  HR: 93 (23 Mar 2020 04:51) (80 - 93)  BP: 98/62 (23 Mar 2020 04:51) (98/62 - 107/68)  BP(mean): --  RR: 18 (23 Mar 2020 04:51) (17 - 18)  SpO2: 91% (23 Mar 2020 04:51) (91% - 96%)    PHYSICAL EXAM:    Constitutional: NAD  HEENT: EOMI, throat clear  Neck: No LAD, supple  Respiratory: CTA and P  Cardiovascular: S1 and S2, RRR, no M  Gastrointestinal: BS+, soft, NT/ND  Extremities: No peripheral edema  Vascular: 2+ peripheral pulses  Neurological: A/O x 3, no focal deficits        LABS:                        16.5   6.99  )-----------( 147      ( 23 Mar 2020 10:10 )             51.6     03    132<L>  |  98  |  21  ----------------------------<  119<H>  4.5   |  20<L>  |  1.14    Ca    8.6      23 Mar 2020 10:10    TPro  6.7  /  Alb  3.6  /  TBili  1.0  /  DBili  x   /  AST  36  /  ALT  39  /  AlkPhos  31<L>  03-22    PT/INR - ( 22 Mar 2020 04:22 )   PT: 13.3 sec;   INR: 1.16 ratio         PTT - ( 22 Mar 2020 04:22 )  PTT:34.5 sec  Urinalysis Basic - ( 22 Mar 2020 08:12 )    Color: Yellow / Appearance: Clear / S.041 / pH: x  Gluc: x / Ketone: Negative  / Bili: Negative / Urobili: Negative   Blood: x / Protein: Trace / Nitrite: Negative   Leuk Esterase: Negative / RBC: 1 /hpf / WBC 1 /HPF   Sq Epi: x / Non Sq Epi: 1 /hpf / Bacteria: Negative        RADIOLOGY & ADDITIONAL TESTS:

## 2020-03-24 NOTE — PROGRESS NOTE ADULT - ATTENDING COMMENTS
Agree with above NP note.  cv stable  hold all lv dysfxn, diuretics   cont midodrine as needed for bp support  tx of c diff per med

## 2020-03-25 LAB
ANION GAP SERPL CALC-SCNC: 14 MMOL/L — SIGNIFICANT CHANGE UP (ref 5–17)
BUN SERPL-MCNC: 18 MG/DL — SIGNIFICANT CHANGE UP (ref 7–23)
CALCIUM SERPL-MCNC: 7.9 MG/DL — LOW (ref 8.4–10.5)
CHLORIDE SERPL-SCNC: 101 MMOL/L — SIGNIFICANT CHANGE UP (ref 96–108)
CO2 SERPL-SCNC: 18 MMOL/L — LOW (ref 22–31)
CREAT SERPL-MCNC: 1.08 MG/DL — SIGNIFICANT CHANGE UP (ref 0.5–1.3)
GLUCOSE SERPL-MCNC: 109 MG/DL — HIGH (ref 70–99)
POTASSIUM SERPL-MCNC: 4.7 MMOL/L — SIGNIFICANT CHANGE UP (ref 3.5–5.3)
POTASSIUM SERPL-SCNC: 4.7 MMOL/L — SIGNIFICANT CHANGE UP (ref 3.5–5.3)
SODIUM SERPL-SCNC: 133 MMOL/L — LOW (ref 135–145)

## 2020-03-25 RX ORDER — LACTOBACILLUS ACIDOPHILUS 100MM CELL
1 CAPSULE ORAL
Refills: 0 | Status: DISCONTINUED | OUTPATIENT
Start: 2020-03-25 | End: 2020-03-26

## 2020-03-25 RX ADMIN — MIDODRINE HYDROCHLORIDE 2.5 MILLIGRAM(S): 2.5 TABLET ORAL at 05:08

## 2020-03-25 RX ADMIN — CLOPIDOGREL BISULFATE 75 MILLIGRAM(S): 75 TABLET, FILM COATED ORAL at 12:40

## 2020-03-25 RX ADMIN — MIDODRINE HYDROCHLORIDE 2.5 MILLIGRAM(S): 2.5 TABLET ORAL at 12:40

## 2020-03-25 RX ADMIN — MIDODRINE HYDROCHLORIDE 2.5 MILLIGRAM(S): 2.5 TABLET ORAL at 17:24

## 2020-03-25 RX ADMIN — Medication 125 MILLIGRAM(S): at 22:56

## 2020-03-25 RX ADMIN — Medication 1 TABLET(S): at 17:24

## 2020-03-25 RX ADMIN — Medication 1 TABLET(S): at 05:08

## 2020-03-25 RX ADMIN — HEPARIN SODIUM 5000 UNIT(S): 5000 INJECTION INTRAVENOUS; SUBCUTANEOUS at 17:25

## 2020-03-25 RX ADMIN — Medication 125 MILLIGRAM(S): at 12:40

## 2020-03-25 RX ADMIN — Medication 125 MILLIGRAM(S): at 17:24

## 2020-03-25 RX ADMIN — HEPARIN SODIUM 5000 UNIT(S): 5000 INJECTION INTRAVENOUS; SUBCUTANEOUS at 05:08

## 2020-03-25 RX ADMIN — Medication 125 MILLIGRAM(S): at 05:08

## 2020-03-25 RX ADMIN — ATORVASTATIN CALCIUM 80 MILLIGRAM(S): 80 TABLET, FILM COATED ORAL at 22:56

## 2020-03-25 RX ADMIN — Medication 81 MILLIGRAM(S): at 12:40

## 2020-03-25 NOTE — PROGRESS NOTE ADULT - ASSESSMENT
echo 11/20/16 EF 20%, sev global LV dysfx. mod pulm htn, mod to severe MR   Cath 11/18/16 with patent grafts     64 M   h/o  CAD, S/P CABG 2016, MI/  STEMI 2016, s/p cardiogenic shock , s/p AICD, mod-sev MR, Systolic CHF,  presenting with here for 2 episode of syncope and diarrhea + cdiff    1. Syncope   secondary to hypovolemia in the setting of diarrhea/ use of diuretics, anti-htn meds/ decrease po intake   + orthostatic hypotension   continue to hold all diuretics and anti-htn meds  check echo to eval MR, lv   c/w midodrine 2.5 mg TID  trop neg  ep to interrogate AICD   CT head noted no acute findings, ?subarachnoid spaces, subdural hygromas or chronic subdural hematomas.  check EKG     2.  CAD, S/P CABG 2016  cv stable c/w asa, plavix , statin      3. Chronic systolic CHF s/p AICD  no evidence of fluid overload on exam   continue to hold all diuretics/ lv dysfx meds secondary to hypotension   echo     4. CDIFF  management per ID/GI    dvt ppx

## 2020-03-25 NOTE — PROGRESS NOTE ADULT - SUBJECTIVE AND OBJECTIVE BOX
INTERVAL HPI/OVERNIGHT EVENTS:  Pt seen and examined at bedside.     Allergies/Intolerance: No Known Allergies      MEDICATIONS  (STANDING):  aspirin enteric coated 81 milliGRAM(s) Oral daily  atorvastatin 80 milliGRAM(s) Oral at bedtime  clopidogrel Tablet 75 milliGRAM(s) Oral daily  heparin  Injectable 5000 Unit(s) SubCutaneous every 12 hours  lactobacillus acidophilus 1 Tablet(s) Oral three times a day  midodrine. 2.5 milliGRAM(s) Oral three times a day  vancomycin    Solution 125 milliGRAM(s) Oral every 6 hours    MEDICATIONS  (PRN):        ROS: all systems reviewed and wnl      PHYSICAL EXAMINATION:  Vital Signs Last 24 Hrs  T(C): 36.6 (25 Mar 2020 04:52), Max: 36.8 (24 Mar 2020 20:27)  T(F): 97.9 (25 Mar 2020 04:52), Max: 98.2 (24 Mar 2020 20:27)  HR: 92 (25 Mar 2020 04:52) (65 - 92)  BP: 100/67 (25 Mar 2020 04:52) (100/67 - 105/73)  BP(mean): --  RR: 18 (25 Mar 2020 04:52) (18 - 18)  SpO2: 93% (25 Mar 2020 04:52) (90% - 93%)  CAPILLARY BLOOD GLUCOSE            GENERAL:   NECK: supple, No JVD  CHEST/LUNG: clear to auscultation bilaterally; no rales, rhonchi, or wheezing b/l  HEART: normal S1, S2  ABDOMEN: BS+, soft, ND, NT   EXTREMITIES:  pulses palpable; no clubbing, cyanosis, or edema b/l LEs  SKIN: no rashes or lesions      LABS:                        14.5   5.17  )-----------( 102      ( 24 Mar 2020 06:47 )             43.6     03-25    133<L>  |  101  |  18  ----------------------------<  109<H>  4.7   |  18<L>  |  1.08    Ca    7.9<L>      25 Mar 2020 06:04          C Diff by PCR Result: Detected (03-22-20 @ 12:25) INTERVAL HPI/OVERNIGHT EVENTS:  Pt seen and examined at bedside.     Allergies/Intolerance: No Known Allergies      MEDICATIONS  (STANDING):  aspirin enteric coated 81 milliGRAM(s) Oral daily  atorvastatin 80 milliGRAM(s) Oral at bedtime  clopidogrel Tablet 75 milliGRAM(s) Oral daily  heparin  Injectable 5000 Unit(s) SubCutaneous every 12 hours  lactobacillus acidophilus 1 Tablet(s) Oral three times a day  midodrine. 2.5 milliGRAM(s) Oral three times a day  vancomycin    Solution 125 milliGRAM(s) Oral every 6 hours    MEDICATIONS  (PRN):        ROS: all systems reviewed and wnl      PHYSICAL EXAMINATION:  Vital Signs Last 24 Hrs  T(C): 36.6 (25 Mar 2020 04:52), Max: 36.8 (24 Mar 2020 20:27)  T(F): 97.9 (25 Mar 2020 04:52), Max: 98.2 (24 Mar 2020 20:27)  HR: 92 (25 Mar 2020 04:52) (65 - 92)  BP: 100/67 (25 Mar 2020 04:52) (100/67 - 105/73)  BP(mean): --  RR: 18 (25 Mar 2020 04:52) (18 - 18)  SpO2: 93% (25 Mar 2020 04:52) (90% - 93%)  CAPILLARY BLOOD GLUCOSE            GENERAL: stable, in bed, comfortable, eating breakfast, BM normalizing  NECK: supple, No JVD  CHEST/LUNG: clear to auscultation bilaterally; no rales, rhonchi, or wheezing b/l  HEART: normal S1, S2  ABDOMEN: BS+, soft, ND, NT   EXTREMITIES:  pulses palpable; no clubbing, cyanosis, or edema b/l LEs  SKIN: no rashes or lesions      LABS:                        14.5   5.17  )-----------( 102      ( 24 Mar 2020 06:47 )             43.6     03-25    133<L>  |  101  |  18  ----------------------------<  109<H>  4.7   |  18<L>  |  1.08    Ca    7.9<L>      25 Mar 2020 06:04          C Diff by PCR Result: Detected (03-22-20 @ 12:25)

## 2020-03-25 NOTE — PROGRESS NOTE ADULT - ASSESSMENT
64 M   h/o   MI/  STEMI 2016 .  s/p    cardiogenic shock ,    s/p AICD placement    here for 2 episode of syncope   in the setting of diarrhea x 2 days.     Has been having 3 episodes of profuse loose stools. No recent abx use.     Has been following strict fluid control prescribed by Dr Vincent.    Had two episodes of passing out, each time while he was standing up from a lying down position. Here because he passed out second time . Both times were on his bed, no injuries. No fevers, chills.        Cards: Jhony Thomas MD  PCP: Raul Harris MD   Aspirin 81 MG TABS  Atorvastatin Calcium 80 MG Oral Tablet; Take 1 tablet daily  Carvedilol 6.25 MG Oral Tablet; Take 1 tablet twice daily  Entresto  MG Oral Tablet; TAKE 1 TABLET BY MOUTH TWICE A DAY  Ezetimibe 10 MG Oral Tablet; Take 1 tablet daily  Lasix 20 MG Oral Tablet; TAKE 1 TABLET EVERY OTHER DAY  metFORMIN HCl - 500 MG Oral Tablet; Take 1 tablet twice daily  Plavix 75 MG Oral Tablet  Spironolactone 25 MG Oral Tablet; TAKE 1 TABLET DAILY  Tudorza Pressair 400 MCG/ACT Inhalation Aerosol Powder Breath Activated; 1 puff BID as directed (22 Mar 2020 10:40)    ER vitals:  T 98, P 98, BP 74/48.  WBC 11.9.  Cdiff PCR (+).  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 No pneumothorax. No pneumomediastinum.  Moderate upper lobe predominant centrilobular emphysema. No evidence of pneumonia. Prominent gallbladder wall thickening in the body and fundus of the gallbladder up to 1 cm, which may be related to collapse, possibly adenomyomatosis.   Pt on oral vanco for Cdiff trt.  ID consult called for further managment.       C.diff:    - Pt with profuse watery diarrhea, p/w syncope, low BP, elevated WBC.  Agree with vanco 125mg PO q6.    - Cont supportive care, IVF hydration, replete electrolytes.      - Monitor serial abdominal exam, stool output and consistency.  Diarrhea improving, states stools more formed today    - Pt with fever 101.9  on 3/23.  Cont to monitor - possibly due to C.diff.   Pt was sob, now resolved.  Repeat Cxr is clear.  IV fluids d/c'd.   Pt states he has a chronic cough due to smoking, has remained at home for all of last week. CT chest with predominant upper lobe emphysema.   Pt currently without wheezing, breathing on RA.    No further ID w/u at this time. Complete 14 day course of PO vanco.  Can cont probiotics.        Will follow,    Demi Gallegos  [phone]

## 2020-03-25 NOTE — PROGRESS NOTE ADULT - SUBJECTIVE AND OBJECTIVE BOX
Infectious Diseases progress note:    Subjective: NAD, feeling better.  States sob has improved.  No cough, no new fever.  Stools are more formed today.  No abd pain, n/v.  Tolerating PO.     ROS:  CONSTITUTIONAL:  No fever, chills, rigors  CARDIOVASCULAR:  No chest pain or palpitations  RESPIRATORY:   No SOB, cough, dyspnea on exertion.  No wheezing  GASTROINTESTINAL:  No abd pain, N/V, diarrhea/constipation  EXTREMITIES:  No swelling or joint pain  GENITOURINARY:  No burning on urination, increased frequency or urgency.  No flank pain  NEUROLOGIC:  No HA, visual disturbances  SKIN: No rashes    Allergies    No Known Allergies    Intolerances        ANTIBIOTICS/RELEVANT:  antimicrobials  vancomycin    Solution 125 milliGRAM(s) Oral every 6 hours    immunologic:    OTHER:  aspirin enteric coated 81 milliGRAM(s) Oral daily  atorvastatin 80 milliGRAM(s) Oral at bedtime  clopidogrel Tablet 75 milliGRAM(s) Oral daily  heparin  Injectable 5000 Unit(s) SubCutaneous every 12 hours  lactobacillus acidophilus 1 Tablet(s) Oral two times a day  midodrine. 2.5 milliGRAM(s) Oral three times a day      Objective:  Vital Signs Last 24 Hrs  T(C): 36.6 (25 Mar 2020 04:52), Max: 36.8 (24 Mar 2020 20:27)  T(F): 97.9 (25 Mar 2020 04:52), Max: 98.2 (24 Mar 2020 20:27)  HR: 92 (25 Mar 2020 04:52) (65 - 92)  BP: 100/67 (25 Mar 2020 04:52) (100/67 - 105/73)  BP(mean): --  RR: 18 (25 Mar 2020 04:52) (18 - 18)  SpO2: 93% (25 Mar 2020 04:52) (90% - 93%)    PHYSICAL EXAM:  Constitutional:NAD  Eyes:JOSS, EOMI  Ear/Nose/Throat: no thrush, mucositis.  Moist mucous membranes	  Neck:no JVD, no lymphadenopathy, supple  Respiratory: CTA charles  Cardiovascular: S1S2 RRR, no murmurs  Gastrointestinal:soft, nontender,  nondistended (+) BS  Extremities:no e/e/c  Skin:  no rashes, open wounds or ulcerations        LABS:                        14.5   5.17  )-----------( 102      ( 24 Mar 2020 06:47 )             43.6     03-25    133<L>  |  101  |  18  ----------------------------<  109<H>  4.7   |  18<L>  |  1.08    Ca    7.9<L>      25 Mar 2020 06:04            Procalcitonin, Serum: 0.12 (03-23 @ 23:12)            MICROBIOLOGY:    Culture - Blood (03.24.20 @ 01:52)    Specimen Source: .Blood Blood-Peripheral    Culture Results:   No growth to date.    Culture - Blood (03.24.20 @ 01:52)    Specimen Source: .Blood Blood-Peripheral    Culture Results:   No growth to date.    Culture - Stool (03.22.20 @ 18:23)    Specimen Source: .Stool Feces    Culture Results:   No enteric pathogens isolated.  (Stool culture examined for Salmonella,  Shigella, Campylobacter, Aeromonas, Plesiomonas,  Vibrio, E.coli O157 and Yersinia)    Culture - Urine (03.22.20 @ 12:31)    Specimen Source: .Urine Clean Catch (Midstream)    Culture Results:   No growth          RADIOLOGY & ADDITIONAL STUDIES:    < from: Xray Chest 1 View- PORTABLE-Urgent (03.24.20 @ 16:39) >      < end of copied text > Infectious Diseases progress note:    Subjective: NAD, feeling better.  States sob has improved.  No cough, no new fever.  Stools are more formed today.  No abd pain, n/v.  Tolerating PO.     ROS:  CONSTITUTIONAL:  No fever, chills, rigors  CARDIOVASCULAR:  No chest pain or palpitations  RESPIRATORY:   No SOB, cough, dyspnea on exertion.  No wheezing  GASTROINTESTINAL:  No abd pain, N/V, diarrhea/constipation  EXTREMITIES:  No swelling or joint pain  GENITOURINARY:  No burning on urination, increased frequency or urgency.  No flank pain  NEUROLOGIC:  No HA, visual disturbances  SKIN: No rashes    Allergies    No Known Allergies    Intolerances        ANTIBIOTICS/RELEVANT:  antimicrobials  vancomycin    Solution 125 milliGRAM(s) Oral every 6 hours    immunologic:    OTHER:  aspirin enteric coated 81 milliGRAM(s) Oral daily  atorvastatin 80 milliGRAM(s) Oral at bedtime  clopidogrel Tablet 75 milliGRAM(s) Oral daily  heparin  Injectable 5000 Unit(s) SubCutaneous every 12 hours  lactobacillus acidophilus 1 Tablet(s) Oral two times a day  midodrine. 2.5 milliGRAM(s) Oral three times a day      Objective:  Vital Signs Last 24 Hrs  T(C): 36.6 (25 Mar 2020 04:52), Max: 36.8 (24 Mar 2020 20:27)  T(F): 97.9 (25 Mar 2020 04:52), Max: 98.2 (24 Mar 2020 20:27)  HR: 92 (25 Mar 2020 04:52) (65 - 92)  BP: 100/67 (25 Mar 2020 04:52) (100/67 - 105/73)  BP(mean): --  RR: 18 (25 Mar 2020 04:52) (18 - 18)  SpO2: 93% (25 Mar 2020 04:52) (90% - 93%)    PHYSICAL EXAM:  Constitutional:NAD  Eyes:JOSS, EOMI  Ear/Nose/Throat: no thrush, mucositis.  Moist mucous membranes	  Neck:no JVD, no lymphadenopathy, supple  Respiratory: CTA charles  Cardiovascular: S1S2 RRR, no murmurs  Gastrointestinal:soft, nontender,  nondistended (+) BS  Extremities:no e/e/c  Skin:  no rashes, open wounds or ulcerations        LABS:                        14.5   5.17  )-----------( 102      ( 24 Mar 2020 06:47 )             43.6     03-25    133<L>  |  101  |  18  ----------------------------<  109<H>  4.7   |  18<L>  |  1.08    Ca    7.9<L>      25 Mar 2020 06:04            Procalcitonin, Serum: 0.12 (03-23 @ 23:12)            MICROBIOLOGY:    Culture - Blood (03.24.20 @ 01:52)    Specimen Source: .Blood Blood-Peripheral    Culture Results:   No growth to date.    Culture - Blood (03.24.20 @ 01:52)    Specimen Source: .Blood Blood-Peripheral    Culture Results:   No growth to date.    Culture - Stool (03.22.20 @ 18:23)    Specimen Source: .Stool Feces    Culture Results:   No enteric pathogens isolated.  (Stool culture examined for Salmonella,  Shigella, Campylobacter, Aeromonas, Plesiomonas,  Vibrio, E.coli O157 and Yersinia)    Culture - Urine (03.22.20 @ 12:31)    Specimen Source: .Urine Clean Catch (Midstream)    Culture Results:   No growth          RADIOLOGY & ADDITIONAL STUDIES:      < from: Xray Chest 1 View- PORTABLE-Urgent (03.24.20 @ 16:39) >  FINDINGS:     Left chest wall dual-lead pacemaker. Status post median sternotomy. The sternotomy wires are , unchanged. No pleural effusion or pneumothorax. Emphysema. Clear lungs. Unremarkable osseous structures.    IMPRESSION:   Clear lungs.    < end of copied text >        < from: CT Head No Cont (03.22.20 @ 13:01) >  IMPRESSION:    Left greater than right small bifrontal CSF extra-axial density which may represent prominent subarachnoid spaces, subdural hygromas or chronic subdural hematomas.    Otherwise unremarkable exam.    < end of copied text >

## 2020-03-25 NOTE — PROGRESS NOTE ADULT - ASSESSMENT
Admitted for C.diff. Complete oral Vanco course 125 mg QID and Acidophilus bid. Hold Entresto, Coreg and Lasix for now. Continue ASA 81 mg/day, Plavix 75 mg/day, Lipitor 80 mg/day.     Plan: When BM normalize will discharge home. No further testing needed. Stop telemetry.

## 2020-03-25 NOTE — PROGRESS NOTE ADULT - SUBJECTIVE AND OBJECTIVE BOX
CARDIOLOGY FOLLOW UP - Dr. Suh    CC no cp or sob   diarrhea resolving       PHYSICAL EXAM:  T(C): 36.6 (03-25-20 @ 04:52), Max: 36.8 (03-24-20 @ 20:27)  HR: 92 (03-25-20 @ 04:52) (65 - 92)  BP: 100/67 (03-25-20 @ 04:52) (100/67 - 105/73)  RR: 18 (03-25-20 @ 04:52) (18 - 18)  SpO2: 93% (03-25-20 @ 04:52) (90% - 93%)  Wt(kg): --  I&O's Summary      Appearance: Normal	  Cardiovascular: Normal S1 S2,RRR, No JVD, No murmurs  Respiratory: Lungs clear to auscultation	  Gastrointestinal:  Soft, Non-tender, + BS	  Extremities: Normal range of motion, No clubbing, cyanosis or edema        MEDICATIONS  (STANDING):  aspirin enteric coated 81 milliGRAM(s) Oral daily  atorvastatin 80 milliGRAM(s) Oral at bedtime  clopidogrel Tablet 75 milliGRAM(s) Oral daily  heparin  Injectable 5000 Unit(s) SubCutaneous every 12 hours  lactobacillus acidophilus 1 Tablet(s) Oral two times a day  midodrine. 2.5 milliGRAM(s) Oral three times a day  vancomycin    Solution 125 milliGRAM(s) Oral every 6 hours      TELEMETRY: nsr  	    ECG:  	  RADIOLOGY:   DIAGNOSTIC TESTING:  [ ] Echocardiogram:    [ ]  Catheterization:  [ ] Stress Test:    OTHER: 	    LABS:	 	    Troponin T, High Sensitivity Result: 21 ng/L [0 - 51] (03-22 @ 04:22)                          14.5   5.17  )-----------( 102      ( 24 Mar 2020 06:47 )             43.6     03-25    133<L>  |  101  |  18  ----------------------------<  109<H>  4.7   |  18<L>  |  1.08    Ca    7.9<L>      25 Mar 2020 06:04

## 2020-03-25 NOTE — PROGRESS NOTE ADULT - SUBJECTIVE AND OBJECTIVE BOX
INTERVAL HPI/OVERNIGHT EVENTS:    patient seen and examined earlier this morning  events noted  reports last bm was yesterday, stool slowly beginning to form as per patient   tolerating PO  denies fever, chills, n/v abd pain     MEDICATIONS  (STANDING):  aspirin enteric coated 81 milliGRAM(s) Oral daily  atorvastatin 80 milliGRAM(s) Oral at bedtime  clopidogrel Tablet 75 milliGRAM(s) Oral daily  heparin  Injectable 5000 Unit(s) SubCutaneous every 12 hours  lactobacillus acidophilus 1 Tablet(s) Oral three times a day  sodium chloride 0.9%. 1000 milliLiter(s) (70 mL/Hr) IV Continuous <Continuous>  vancomycin    Solution 125 milliGRAM(s) Oral every 6 hours    MEDICATIONS  (PRN):      Allergies    No Known Allergies    Intolerances        Review of Systems:    General:  No wt loss, fevers, chills, night sweats,fatigue,   Eyes:  Good vision, no reported pain  ENT:  No sore throat, pain, runny nose, dysphagia  CV:  No pain, palpitatioins, hypo/hypertension  Resp:  No dyspnea, cough, tachypnea, wheezing  GI:  No pain, No nausea, No vomiting, +diarrhea, No constipation, No weight loss, No fever, No pruritis, No rectal bleeding, No tarry stools, No dysphagia,  :  No pain, bleeding, incontinence, nocturia  Muscle:  No pain, weakness  Neuro:  No weakness, tingling, memory problems  Psych:  No fatigue, insomnia, mood problems, depression  Endocrine:  No polyuria, polydypsia, cold/heat intolerance  Heme:  No petechiae, ecchymosis, easy bruisability  Skin:  No rash, tattoos, scars, edema      Vital Signs Last 24 Hrs  T(C): 37.3 (23 Mar 2020 04:51), Max: 38.1 (22 Mar 2020 20:34)  T(F): 99.2 (23 Mar 2020 04:51), Max: 100.5 (22 Mar 2020 20:34)  HR: 93 (23 Mar 2020 04:51) (80 - 93)  BP: 98/62 (23 Mar 2020 04:51) (98/62 - 107/68)  BP(mean): --  RR: 18 (23 Mar 2020 04:51) (17 - 18)  SpO2: 91% (23 Mar 2020 04:51) (91% - 96%)    PHYSICAL EXAM:    Constitutional: NAD  HEENT: EOMI, throat clear  Neck: No LAD, supple  Respiratory: CTA and P  Cardiovascular: S1 and S2, RRR, no M  Gastrointestinal: BS+, soft, NT/ND  Extremities: No peripheral edema  Vascular: 2+ peripheral pulses  Neurological: A/O x 3, no focal deficits        LABS:                        16.5   6.99  )-----------( 147      ( 23 Mar 2020 10:10 )             51.6     03-    132<L>  |  98  |  21  ----------------------------<  119<H>  4.5   |  20<L>  |  1.14    Ca    8.6      23 Mar 2020 10:10    TPro  6.7  /  Alb  3.6  /  TBili  1.0  /  DBili  x   /  AST  36  /  ALT  39  /  AlkPhos  31<L>  03-22    PT/INR - ( 22 Mar 2020 04:22 )   PT: 13.3 sec;   INR: 1.16 ratio         PTT - ( 22 Mar 2020 04:22 )  PTT:34.5 sec  Urinalysis Basic - ( 22 Mar 2020 08:12 )    Color: Yellow / Appearance: Clear / S.041 / pH: x  Gluc: x / Ketone: Negative  / Bili: Negative / Urobili: Negative   Blood: x / Protein: Trace / Nitrite: Negative   Leuk Esterase: Negative / RBC: 1 /hpf / WBC 1 /HPF   Sq Epi: x / Non Sq Epi: 1 /hpf / Bacteria: Negative        RADIOLOGY & ADDITIONAL TESTS:

## 2020-03-25 NOTE — PROGRESS NOTE ADULT - ASSESSMENT
diarrhea  cad  s/p aicd    C.diff + - improving  cont vanco 125mg po q6  cont Bacid TID   diet as tolerated  supportive care  monitor electrolytes, replete prn   monitor stool output   ct reviewed, slightly thickening gb possible adenomyomatosis  unable to get mri 2/2 aicd  monitor with u/s every 3-6 months  no objection to cont dual antiplatlet therapy  cardiology eval noted; diuretics on hold 2/2 hypotension

## 2020-03-26 ENCOUNTER — TRANSCRIPTION ENCOUNTER (OUTPATIENT)
Age: 65
End: 2020-03-26

## 2020-03-26 VITALS
HEART RATE: 89 BPM | OXYGEN SATURATION: 92 % | SYSTOLIC BLOOD PRESSURE: 103 MMHG | DIASTOLIC BLOOD PRESSURE: 66 MMHG | RESPIRATION RATE: 18 BRPM | TEMPERATURE: 98 F

## 2020-03-26 LAB
ANION GAP SERPL CALC-SCNC: 12 MMOL/L — SIGNIFICANT CHANGE UP (ref 5–17)
BUN SERPL-MCNC: 16 MG/DL — SIGNIFICANT CHANGE UP (ref 7–23)
CALCIUM SERPL-MCNC: 7.6 MG/DL — LOW (ref 8.4–10.5)
CHLORIDE SERPL-SCNC: 97 MMOL/L — SIGNIFICANT CHANGE UP (ref 96–108)
CO2 SERPL-SCNC: 19 MMOL/L — LOW (ref 22–31)
CREAT SERPL-MCNC: 0.95 MG/DL — SIGNIFICANT CHANGE UP (ref 0.5–1.3)
CULTURE RESULTS: SIGNIFICANT CHANGE UP
GLUCOSE SERPL-MCNC: 103 MG/DL — HIGH (ref 70–99)
POTASSIUM SERPL-MCNC: 4.5 MMOL/L — SIGNIFICANT CHANGE UP (ref 3.5–5.3)
POTASSIUM SERPL-SCNC: 4.5 MMOL/L — SIGNIFICANT CHANGE UP (ref 3.5–5.3)
SODIUM SERPL-SCNC: 128 MMOL/L — LOW (ref 135–145)
SPECIMEN SOURCE: SIGNIFICANT CHANGE UP

## 2020-03-26 PROCEDURE — 86803 HEPATITIS C AB TEST: CPT

## 2020-03-26 PROCEDURE — 36415 COLL VENOUS BLD VENIPUNCTURE: CPT

## 2020-03-26 PROCEDURE — 87493 C DIFF AMPLIFIED PROBE: CPT

## 2020-03-26 PROCEDURE — 82947 ASSAY GLUCOSE BLOOD QUANT: CPT

## 2020-03-26 PROCEDURE — 84295 ASSAY OF SERUM SODIUM: CPT

## 2020-03-26 PROCEDURE — 74177 CT ABD & PELVIS W/CONTRAST: CPT

## 2020-03-26 PROCEDURE — 86900 BLOOD TYPING SEROLOGIC ABO: CPT

## 2020-03-26 PROCEDURE — 87507 IADNA-DNA/RNA PROBE TQ 12-25: CPT

## 2020-03-26 PROCEDURE — 82330 ASSAY OF CALCIUM: CPT

## 2020-03-26 PROCEDURE — 87086 URINE CULTURE/COLONY COUNT: CPT

## 2020-03-26 PROCEDURE — 82803 BLOOD GASES ANY COMBINATION: CPT

## 2020-03-26 PROCEDURE — 71260 CT THORAX DX C+: CPT

## 2020-03-26 PROCEDURE — 80048 BASIC METABOLIC PNL TOTAL CA: CPT

## 2020-03-26 PROCEDURE — 80053 COMPREHEN METABOLIC PANEL: CPT

## 2020-03-26 PROCEDURE — 82435 ASSAY OF BLOOD CHLORIDE: CPT

## 2020-03-26 PROCEDURE — 84132 ASSAY OF SERUM POTASSIUM: CPT

## 2020-03-26 PROCEDURE — 84145 PROCALCITONIN (PCT): CPT

## 2020-03-26 PROCEDURE — 71045 X-RAY EXAM CHEST 1 VIEW: CPT

## 2020-03-26 PROCEDURE — 87045 FECES CULTURE AEROBIC BACT: CPT

## 2020-03-26 PROCEDURE — 84484 ASSAY OF TROPONIN QUANT: CPT

## 2020-03-26 PROCEDURE — 83605 ASSAY OF LACTIC ACID: CPT

## 2020-03-26 PROCEDURE — 87040 BLOOD CULTURE FOR BACTERIA: CPT

## 2020-03-26 PROCEDURE — 70450 CT HEAD/BRAIN W/O DYE: CPT

## 2020-03-26 PROCEDURE — 87449 NOS EACH ORGANISM AG IA: CPT

## 2020-03-26 PROCEDURE — 99238 HOSP IP/OBS DSCHRG MGMT 30/<: CPT

## 2020-03-26 PROCEDURE — 86850 RBC ANTIBODY SCREEN: CPT

## 2020-03-26 PROCEDURE — 87324 CLOSTRIDIUM AG IA: CPT

## 2020-03-26 PROCEDURE — 93005 ELECTROCARDIOGRAM TRACING: CPT

## 2020-03-26 PROCEDURE — 81001 URINALYSIS AUTO W/SCOPE: CPT

## 2020-03-26 PROCEDURE — 86901 BLOOD TYPING SEROLOGIC RH(D): CPT

## 2020-03-26 PROCEDURE — 85014 HEMATOCRIT: CPT

## 2020-03-26 PROCEDURE — 85730 THROMBOPLASTIN TIME PARTIAL: CPT

## 2020-03-26 PROCEDURE — 99285 EMERGENCY DEPT VISIT HI MDM: CPT

## 2020-03-26 PROCEDURE — 85027 COMPLETE CBC AUTOMATED: CPT

## 2020-03-26 PROCEDURE — 87046 STOOL CULTR AEROBIC BACT EA: CPT

## 2020-03-26 PROCEDURE — 85610 PROTHROMBIN TIME: CPT

## 2020-03-26 RX ORDER — CHOLESTYRAMINE 4 G/9G
1 POWDER, FOR SUSPENSION ORAL
Qty: 20 | Refills: 0
Start: 2020-03-26 | End: 2020-04-04

## 2020-03-26 RX ORDER — SACUBITRIL AND VALSARTAN 24; 26 MG/1; MG/1
1 TABLET, FILM COATED ORAL
Qty: 0 | Refills: 0 | DISCHARGE

## 2020-03-26 RX ORDER — VANCOMYCIN HCL 1 G
2.5 VIAL (EA) INTRAVENOUS
Qty: 100 | Refills: 0
Start: 2020-03-26 | End: 2020-04-04

## 2020-03-26 RX ORDER — CARVEDILOL PHOSPHATE 80 MG/1
1 CAPSULE, EXTENDED RELEASE ORAL
Qty: 0 | Refills: 0 | DISCHARGE

## 2020-03-26 RX ORDER — CHOLESTYRAMINE 4 G/9G
1 POWDER, FOR SUSPENSION ORAL
Qty: 40 | Refills: 0
Start: 2020-03-26 | End: 2020-04-04

## 2020-03-26 RX ORDER — VANCOMYCIN HCL 1 G
5 VIAL (EA) INTRAVENOUS
Qty: 200 | Refills: 0
Start: 2020-03-26 | End: 2020-04-04

## 2020-03-26 RX ORDER — LACTOBACILLUS ACIDOPHILUS 100MM CELL
1 CAPSULE ORAL
Qty: 60 | Refills: 0
Start: 2020-03-26 | End: 2020-04-24

## 2020-03-26 RX ORDER — LACTOBACILLUS ACIDOPHILUS 100MM CELL
1 CAPSULE ORAL
Qty: 30 | Refills: 0
Start: 2020-03-26 | End: 2020-04-24

## 2020-03-26 RX ORDER — CARVEDILOL PHOSPHATE 80 MG/1
1 CAPSULE, EXTENDED RELEASE ORAL
Qty: 60 | Refills: 0
Start: 2020-03-26 | End: 2020-04-24

## 2020-03-26 RX ORDER — CHOLESTYRAMINE 4 G/9G
4 POWDER, FOR SUSPENSION ORAL
Refills: 0 | Status: DISCONTINUED | OUTPATIENT
Start: 2020-03-26 | End: 2020-03-26

## 2020-03-26 RX ADMIN — Medication 125 MILLIGRAM(S): at 18:07

## 2020-03-26 RX ADMIN — CLOPIDOGREL BISULFATE 75 MILLIGRAM(S): 75 TABLET, FILM COATED ORAL at 13:48

## 2020-03-26 RX ADMIN — Medication 1 TABLET(S): at 05:13

## 2020-03-26 RX ADMIN — Medication 81 MILLIGRAM(S): at 13:48

## 2020-03-26 RX ADMIN — Medication 1 TABLET(S): at 18:07

## 2020-03-26 RX ADMIN — MIDODRINE HYDROCHLORIDE 2.5 MILLIGRAM(S): 2.5 TABLET ORAL at 13:48

## 2020-03-26 RX ADMIN — Medication 125 MILLIGRAM(S): at 05:14

## 2020-03-26 RX ADMIN — CHOLESTYRAMINE 4 GRAM(S): 4 POWDER, FOR SUSPENSION ORAL at 09:03

## 2020-03-26 RX ADMIN — MIDODRINE HYDROCHLORIDE 2.5 MILLIGRAM(S): 2.5 TABLET ORAL at 05:14

## 2020-03-26 RX ADMIN — Medication 125 MILLIGRAM(S): at 13:47

## 2020-03-26 RX ADMIN — MIDODRINE HYDROCHLORIDE 2.5 MILLIGRAM(S): 2.5 TABLET ORAL at 18:07

## 2020-03-26 RX ADMIN — HEPARIN SODIUM 5000 UNIT(S): 5000 INJECTION INTRAVENOUS; SUBCUTANEOUS at 05:13

## 2020-03-26 NOTE — DISCHARGE NOTE NURSING/CASE MANAGEMENT/SOCIAL WORK - PATIENT PORTAL LINK FT
You can access the FollowMyHealth Patient Portal offered by [facility] by registering at the following website: [email or web address]. By joining [facility]’s FollowMyHealth portal, you will also be able to view your health information using other applications (apps) compatible with our system.

## 2020-03-26 NOTE — PROGRESS NOTE ADULT - ASSESSMENT
echo 11/20/16 EF 20%, sev global LV dysfx. mod pulm htn, mod to severe MR   Cath 11/18/16 with patent grafts     64 M   h/o  CAD, S/P CABG 2016, MI/  STEMI 2016, s/p cardiogenic shock , s/p AICD, mod-sev MR, Systolic CHF,  presenting with here for 2 episode of syncope and diarrhea + cdiff    1. Syncope   secondary to hypovolemia in the setting of diarrhea/ use of diuretics, anti-htn meds/ decrease po intake   + orthostatic hypotension   continue to hold all diuretics and anti-htn meds  check echo to eval MR, lv , can be done as outpt if dc planning  c/w midodrine 2.5 mg TID  trop neg  CT head noted no acute findings, ?subarachnoid spaces, subdural hygromas or chronic subdural hematomas.  EKG with no acs     2.  CAD, S/P CABG 2016  cv stable c/w asa, plavix , statin      3. Chronic systolic CHF s/p AICD  no evidence of fluid overload on exam   continue to hold all diuretics/ lv dysfx meds secondary to hypotension     4. CDIFF  management per ID/GI    5. Mod to sev MR  echo 2016 with mod to sev MR   echo pending, can be done as outpt if dc planning     dvt ppx

## 2020-03-26 NOTE — DIETITIAN INITIAL EVALUATION ADULT. - REASON INDICATOR FOR ASSESSMENT
Nutrition Assessment warranted for length of stay.   Source: EMR, Pt Nutrition Assessment warranted for length of stay.   Source: EMR, Pt, RN

## 2020-03-26 NOTE — DISCHARGE NOTE PROVIDER - NSDCFUADDAPPT_GEN_ALL_CORE_FT
Appt with EP for Device check 04/29/20 -Appt with EP for Device check 04/29/20  - Follow up with PMD and have sodium levels checked -Appt with EP for Device check 04/29/20  - Follow up with PMD and have sodium levels checked by Tuesday March 31th  - Schedule outpatient Echo

## 2020-03-26 NOTE — DIETITIAN INITIAL EVALUATION ADULT. - PERTINENT MEDS FT
MEDICATIONS  (STANDING):  aspirin enteric coated 81 milliGRAM(s) Oral daily  atorvastatin 80 milliGRAM(s) Oral at bedtime  cholestyramine Powder (Sugar-Free) 4 Gram(s) Oral two times a day  clopidogrel Tablet 75 milliGRAM(s) Oral daily  heparin  Injectable 5000 Unit(s) SubCutaneous every 12 hours  lactobacillus acidophilus 1 Tablet(s) Oral two times a day  midodrine. 2.5 milliGRAM(s) Oral three times a day  vancomycin    Solution 125 milliGRAM(s) Oral every 6 hours

## 2020-03-26 NOTE — DIETITIAN INITIAL EVALUATION ADULT. - PHYSICAL APPEARANCE
obese/other (specify) Ht: 6'0" Wt: 223.1 pounds  BMI: 30.4 kg/m2 IBW: 178 pounds (+/-10%) 125%IBW  Edema: none noted  Skin per nursing flowsheets: no pressure injuries noted

## 2020-03-26 NOTE — DIETITIAN INITIAL EVALUATION ADULT. - PERTINENT LABORATORY DATA
03-26 Na128 mmol/L<L> Glu 103 mg/dL<H> K+ 4.5 mmol/L Cr  0.95 mg/dL BUN 16 mg/dL Phos n/a   Alb n/a   PAB n/a

## 2020-03-26 NOTE — DISCHARGE NOTE PROVIDER - HOSPITAL COURSE
Admitted for C.diff. Complete oral Vanco course 125 mg QID 14 total day course and Acidophilus bid. Add Questran 4 grams bid. Hold Entresto, Coreg and Lasix for now. Continue ASA 81 mg/day, Plavix 75 mg/day, Lipitor 80 mg/day.         Plan: discharge home. No further testing needed. Stop telemetry.  wean off Midodrine prior to discharge. Admitted for C.diff diarrhea. Complete oral Vanco course 125 mg QID 14 total day course and Acidophilus bid. Add Questran 4 grams bid. Hold Entresto, Coreg and Lasix for now given low BP. Can be started after discharge if BP allows. Continue ASA 81 mg/day, Plavix 75 mg/day, Lipitor 80 mg/day. C.diff testing is positive. CXR is clear. CBC and SMA-7 all normal.  Discharge home. No further testing needed. Stop telemetry.  Weaned off Midodrine prior to discharge. See attached med list.

## 2020-03-26 NOTE — CHART NOTE - NSCHARTNOTEFT_GEN_A_CORE
Patient dc home, med reconciliation completed with    - pt restarted on Lasix, Coreg decreased to 3.125  - Stopped Midodrine  - Na 128 to be follow up out patient wanda Thomas

## 2020-03-26 NOTE — DISCHARGE NOTE PROVIDER - CARE PROVIDER_API CALL
Alexi Thomas)  Cardiovascular Disease; Internal Medicine  [address] [place] [address]  Phone: [phone]  Fax: [phone]  Follow Up Time: 2 weeks

## 2020-03-26 NOTE — DISCHARGE NOTE PROVIDER - NSDCCPCAREPLAN_GEN_ALL_CORE_FT
PRINCIPAL DISCHARGE DIAGNOSIS  Diagnosis: Syncope and collapse  Assessment and Plan of Treatment: Likely related to fluid imbalance secondary to diarrhea      SECONDARY DISCHARGE DIAGNOSES  Diagnosis: CHF (congestive heart failure)  Assessment and Plan of Treatment: Weigh yourself daily.  If you gain 3lbs in 3 days, or 5lbs in a week call your Health Care Provider.  Do not eat or drink foods containing more than 2000mg of salt (sodium) in your diet every day.  Call your Health Care Provider if you have any swelling or increased swelling in your feet, ankles, and/or stomach.  Take all of your medication as directed.  If you become dizzy call your Health Care Provider.    Diagnosis: Clostridium difficile colitis  Assessment and Plan of Treatment: Continue Vancomycin for 10 more days

## 2020-03-26 NOTE — DIETITIAN INITIAL EVALUATION ADULT. - OTHER INFO
"64 M   h/o  CAD, S/P CABG 2016, MI/  STEMI 2016, s/p cardiogenic shock , s/p AICD, mod-sev MR, Systolic CHF,  presenting with here for 2 episode of syncope and diarrhea + cdiff"    Pt notes slight decrease in appetite since diarrhea began 3 days PTA; appetite relatively back to normal - notes eating 100% at meals in-house now. Notes prior to diarrhea beginning PTA, was eating well at home; 3 meals/day, did not provide diet recall. Denies any weight fluctuations recently aside from possible slight wt loss since diarrhea began. UBW: ~228 pounds; standing scale weight 2/23 - 225.9 pounds, standing weight today, 3/26 - 223.1 pounds - pt was receiving diuretics in-house, has since been d/c'ed. Nutrition focused physical exam not warranted at this time "64 M   h/o  CAD, S/P CABG 2016, MI/  STEMI 2016, s/p cardiogenic shock , s/p AICD, mod-sev MR, Systolic CHF,  presenting with here for 2 episode of syncope and diarrhea + cdiff"    Pt notes slight decrease in appetite since diarrhea began 3 days PTA; appetite relatively back to normal - notes eating % at meals in-house now, amenable to Ensure clear x1 in house. Notes prior to diarrhea beginning PTA, was eating well at home; 3 meals/day, did not provide diet recall. Denies any weight fluctuations recently aside from possible slight wt loss since diarrhea began. UBW: ~228 pounds; standing scale weight 2/23 - 225.9 pounds, standing weight today, 3/26 - 223.1 pounds - pt was receiving diuretics in-house, has since been d/c'ed. Nutrition focused physical exam not warranted at this time; pt appears well developed and resting comfortably in bed; no overt signs of muscle wasting/fat loss observed. Pt denies GI distress at this time aside from diarrhea 2/2 CDiff; RN notes sometimes pt notes nausea. NKFA. Denies micronutrient supplementation PTA.

## 2020-03-26 NOTE — PROGRESS NOTE ADULT - SUBJECTIVE AND OBJECTIVE BOX
INTERVAL HPI/OVERNIGHT EVENTS:  Pt seen and examined at bedside.     Allergies/Intolerance: No Known Allergies      MEDICATIONS  (STANDING):  aspirin enteric coated 81 milliGRAM(s) Oral daily  atorvastatin 80 milliGRAM(s) Oral at bedtime  clopidogrel Tablet 75 milliGRAM(s) Oral daily  heparin  Injectable 5000 Unit(s) SubCutaneous every 12 hours  lactobacillus acidophilus 1 Tablet(s) Oral two times a day  midodrine. 2.5 milliGRAM(s) Oral three times a day  vancomycin    Solution 125 milliGRAM(s) Oral every 6 hours    MEDICATIONS  (PRN):        ROS: all systems reviewed and wnl      PHYSICAL EXAMINATION:  Vital Signs Last 24 Hrs  T(C): 36.7 (26 Mar 2020 05:09), Max: 36.7 (25 Mar 2020 20:21)  T(F): 98.1 (26 Mar 2020 05:09), Max: 98.1 (26 Mar 2020 05:09)  HR: 93 (26 Mar 2020 05:09) (86 - 93)  BP: 96/62 (26 Mar 2020 05:09) (96/62 - 115/72)  BP(mean): --  RR: 17 (26 Mar 2020 05:09) (17 - 18)  SpO2: 92% (26 Mar 2020 05:09) (92% - 94%)  CAPILLARY BLOOD GLUCOSE          03-25 @ 07:01  -  03-26 @ 06:26  --------------------------------------------------------  IN: 480 mL / OUT: 450 mL / NET: 30 mL        GENERAL:   NECK: supple, No JVD  CHEST/LUNG: clear to auscultation bilaterally; no rales, rhonchi, or wheezing b/l  HEART: normal S1, S2  ABDOMEN: BS+, soft, ND, NT   EXTREMITIES:  pulses palpable; no clubbing, cyanosis, or edema b/l LEs  SKIN: no rashes or lesions      LABS:                        14.5   5.17  )-----------( 102      ( 24 Mar 2020 06:47 )             43.6     03-25    133<L>  |  101  |  18  ----------------------------<  109<H>  4.7   |  18<L>  |  1.08    Ca    7.9<L>      25 Mar 2020 06:04 INTERVAL HPI/OVERNIGHT EVENTS:  Pt seen and examined at bedside.     Allergies/Intolerance: No Known Allergies      MEDICATIONS  (STANDING):  aspirin enteric coated 81 milliGRAM(s) Oral daily  atorvastatin 80 milliGRAM(s) Oral at bedtime  clopidogrel Tablet 75 milliGRAM(s) Oral daily  heparin  Injectable 5000 Unit(s) SubCutaneous every 12 hours  lactobacillus acidophilus 1 Tablet(s) Oral two times a day  midodrine. 2.5 milliGRAM(s) Oral three times a day  vancomycin    Solution 125 milliGRAM(s) Oral every 6 hours    MEDICATIONS  (PRN):        ROS: all systems reviewed and wnl      PHYSICAL EXAMINATION:  Vital Signs Last 24 Hrs  T(C): 36.7 (26 Mar 2020 05:09), Max: 36.7 (25 Mar 2020 20:21)  T(F): 98.1 (26 Mar 2020 05:09), Max: 98.1 (26 Mar 2020 05:09)  HR: 93 (26 Mar 2020 05:09) (86 - 93)  BP: 96/62 (26 Mar 2020 05:09) (96/62 - 115/72)  BP(mean): --  RR: 17 (26 Mar 2020 05:09) (17 - 18)  SpO2: 92% (26 Mar 2020 05:09) (92% - 94%)  CAPILLARY BLOOD GLUCOSE          03-25 @ 07:01  -  03-26 @ 06:26  --------------------------------------------------------  IN: 480 mL / OUT: 450 mL / NET: 30 mL        GENERAL: comfortable, still with loose BM, no blood, no abdominal pain   NECK: supple, No JVD  CHEST/LUNG: clear to auscultation bilaterally; no rales, rhonchi, or wheezing b/l  HEART: normal S1, S2  ABDOMEN: BS+, soft, ND, NT   EXTREMITIES:  pulses palpable; no clubbing, cyanosis, or edema b/l LEs  SKIN: no rashes or lesions      LABS:                        14.5   5.17  )-----------( 102      ( 24 Mar 2020 06:47 )             43.6     03-25    133<L>  |  101  |  18  ----------------------------<  109<H>  4.7   |  18<L>  |  1.08    Ca    7.9<L>      25 Mar 2020 06:04

## 2020-03-26 NOTE — PROGRESS NOTE ADULT - SUBJECTIVE AND OBJECTIVE BOX
CARDIOLOGY FOLLOW UP - Dr. Suh    CC no cp or sob   diarrhea improving     PHYSICAL EXAM:  T(C): 36.7 (03-26-20 @ 05:09), Max: 36.7 (03-25-20 @ 20:21)  HR: 93 (03-26-20 @ 05:09) (86 - 93)  BP: 96/62 (03-26-20 @ 05:09) (96/62 - 115/72)  RR: 17 (03-26-20 @ 05:09) (17 - 18)  SpO2: 92% (03-26-20 @ 05:09) (92% - 94%)  Wt(kg): --  I&O's Summary    25 Mar 2020 07:01  -  26 Mar 2020 07:00  --------------------------------------------------------  IN: 480 mL / OUT: 450 mL / NET: 30 mL    26 Mar 2020 07:01  -  26 Mar 2020 10:18  --------------------------------------------------------  IN: 177 mL / OUT: 0 mL / NET: 177 mL        Appearance: Normal	  Cardiovascular: Normal S1 S2,RRR   Respiratory: Lungs clear to auscultation	  Gastrointestinal:  Soft, Non-tender, + BS	  Extremities: Normal range of motion, No clubbing, cyanosis or edema        MEDICATIONS  (STANDING):  aspirin enteric coated 81 milliGRAM(s) Oral daily  atorvastatin 80 milliGRAM(s) Oral at bedtime  cholestyramine Powder (Sugar-Free) 4 Gram(s) Oral two times a day  clopidogrel Tablet 75 milliGRAM(s) Oral daily  heparin  Injectable 5000 Unit(s) SubCutaneous every 12 hours  lactobacillus acidophilus 1 Tablet(s) Oral two times a day  midodrine. 2.5 milliGRAM(s) Oral three times a day  vancomycin    Solution 125 milliGRAM(s) Oral every 6 hours      TELEMETRY: nsr pac	    ECG:   RADIOLOGY:   DIAGNOSTIC TESTING:  [ ] Echocardiogram:  [ ]  Catheterization:  [ ] Stress Test:    OTHER: 	    LABS:	 	    Troponin T, High Sensitivity Result: 21 ng/L [0 - 51] (03-22 @ 04:22)      03-26    128<L>  |  97  |  16  ----------------------------<  103<H>  4.5   |  19<L>  |  0.95    Ca    7.6<L>      26 Mar 2020 06:37

## 2020-03-26 NOTE — PROGRESS NOTE ADULT - ATTENDING COMMENTS
Agree with above NP note.  cv stable  continues to improve  diarrhea improving  diuretics/lv dysfxn meds on hold   cont midodrine as needed  d/c planning

## 2020-03-26 NOTE — PROGRESS NOTE ADULT - ASSESSMENT
Admitted for C.diff. Complete oral Vanco course 125 mg QID 14 total day course and Acidophilus bid. Add Questran 4 grams bid. Hold Entresto, Coreg and Lasix for now. Continue ASA 81 mg/day, Plavix 75 mg/day, Lipitor 80 mg/day.     Plan: When BM normalize will discharge home. No further testing needed. Stop telemetry.  Ideally would like to wean off Midodrine prior to discharge.

## 2020-03-26 NOTE — DISCHARGE NOTE NURSING/CASE MANAGEMENT/SOCIAL WORK - NSDCFUADDAPPT_GEN_ALL_CORE_FT
-Appt with EP for Device check 04/29/20  - Follow up with PMD and have sodium levels checked by Tuesday March 31th  - Schedule outpatient Echo

## 2020-03-26 NOTE — DISCHARGE NOTE PROVIDER - NSDCMRMEDTOKEN_GEN_ALL_CORE_FT
aspirin 81 mg oral tablet: 1 tab(s) orally once a day  atorvastatin 80 mg oral tablet: 1 tab(s) orally once a day (at bedtime)  carvedilol 3.125 mg oral tablet: 1 tab(s) orally 2 times a day  cholestyramine 4 g/9 g oral powder for reconstitution: 1 each orally 4 times a day , 4 hours prior to vancomycin   clopidogrel 75 mg oral tablet: 1 tab(s) orally once a day  furosemide 20 mg oral tablet: 2 tabs (=40 mg) orally once a day  lactobacillus acidophilus oral capsule: 1 each orally once a day   vancomycin 25 mg/mL oral liquid: 5 milliliter(s) orally 4 times a day  Zetia 10 mg oral tablet: 1 tab(s) orally once a day aspirin 81 mg oral tablet: 1 tab(s) orally once a day  atorvastatin 80 mg oral tablet: 1 tab(s) orally once a day (at bedtime)  carvedilol 3.125 mg oral tablet: 1 tab(s) orally 2 times a day  cholestyramine 4 g/9 g oral powder for reconstitution: 1 each orally 4 times a day , 4 hours prior to vancomycin   clopidogrel 75 mg oral tablet: 1 tab(s) orally once a day  furosemide 20 mg oral tablet: 2 tabs (=40 mg) orally once a day  lactobacillus acidophilus oral capsule: 1 each orally once a day   vancomycin 50 mg/mL oral liquid: 2.5 milliliter(s) orally 4 times a day , till april 5th 2020  Zetia 10 mg oral tablet: 1 tab(s) orally once a day aspirin 81 mg oral tablet: 1 tab(s) orally once a day  atorvastatin 80 mg oral tablet: 1 tab(s) orally once a day (at bedtime)  carvedilol 3.125 mg oral tablet: 1 tab(s) orally 2 times a day  cholestyramine 4 g/9 g oral powder for reconstitution: 1 each orally 2 times a day  , 4 hours prior to vancomycin   clopidogrel 75 mg oral tablet: 1 tab(s) orally once a day  furosemide 20 mg oral tablet: 2 tabs (=40 mg) orally once a day  lactobacillus acidophilus oral capsule: 1 each orally 2 times a day   vancomycin 50 mg/mL oral liquid: 2.5 milliliter(s) orally 4 times a day , till april 5th 2020  Zetia 10 mg oral tablet: 1 tab(s) orally once a day

## 2020-03-26 NOTE — DIETITIAN INITIAL EVALUATION ADULT. - ADD RECOMMEND
1. Continue diet as ordered. 2. Recommend addition of Ensure Clear x1 daily (per servinkcals, 8g pro). 3. Monitor PO intake/tolerance, weights, labs, hydration status, bowels, and skin integrity.

## 2020-03-29 LAB
CULTURE RESULTS: SIGNIFICANT CHANGE UP
CULTURE RESULTS: SIGNIFICANT CHANGE UP
SPECIMEN SOURCE: SIGNIFICANT CHANGE UP
SPECIMEN SOURCE: SIGNIFICANT CHANGE UP

## 2020-04-20 ENCOUNTER — APPOINTMENT (OUTPATIENT)
Dept: ELECTROPHYSIOLOGY | Facility: CLINIC | Age: 65
End: 2020-04-20
Payer: MEDICARE

## 2020-04-20 PROCEDURE — 93296 REM INTERROG EVL PM/IDS: CPT

## 2020-04-20 PROCEDURE — 93295 DEV INTERROG REMOTE 1/2/MLT: CPT

## 2020-04-22 ENCOUNTER — APPOINTMENT (OUTPATIENT)
Dept: CARDIOLOGY | Facility: CLINIC | Age: 65
End: 2020-04-22
Payer: MEDICARE

## 2020-04-22 VITALS
WEIGHT: 215 LBS | RESPIRATION RATE: 15 BRPM | DIASTOLIC BLOOD PRESSURE: 78 MMHG | HEART RATE: 64 BPM | BODY MASS INDEX: 29.12 KG/M2 | HEIGHT: 72 IN | SYSTOLIC BLOOD PRESSURE: 105 MMHG

## 2020-04-22 DIAGNOSIS — I25.2 OLD MYOCARDIAL INFARCTION: ICD-10-CM

## 2020-04-22 DIAGNOSIS — Z87.891 PERSONAL HISTORY OF NICOTINE DEPENDENCE: ICD-10-CM

## 2020-04-22 DIAGNOSIS — I73.9 PERIPHERAL VASCULAR DISEASE, UNSPECIFIED: ICD-10-CM

## 2020-04-22 PROCEDURE — 99214 OFFICE O/P EST MOD 30 MIN: CPT

## 2020-04-29 ENCOUNTER — APPOINTMENT (OUTPATIENT)
Dept: ELECTROPHYSIOLOGY | Facility: CLINIC | Age: 65
End: 2020-04-29

## 2020-07-29 ENCOUNTER — APPOINTMENT (OUTPATIENT)
Dept: ELECTROPHYSIOLOGY | Facility: CLINIC | Age: 65
End: 2020-07-29

## 2020-07-30 NOTE — ED ADULT NURSE NOTE - STOOL DESCRIPTION
Patient was pleasant upon approach and cooperative with care  Pt was medication compliant  Pt denies depression, anxiety, SI, HI, AH, VH, pain and feels safe here  Will continue to monitor patient  dark brown

## 2020-08-10 ENCOUNTER — FORM ENCOUNTER (OUTPATIENT)
Age: 65
End: 2020-08-10

## 2020-09-01 ENCOUNTER — APPOINTMENT (OUTPATIENT)
Dept: CARDIOLOGY | Facility: CLINIC | Age: 65
End: 2020-09-01

## 2020-10-01 ENCOUNTER — APPOINTMENT (OUTPATIENT)
Dept: CARDIOLOGY | Facility: CLINIC | Age: 65
End: 2020-10-01
Payer: MEDICARE

## 2020-10-01 VITALS
SYSTOLIC BLOOD PRESSURE: 123 MMHG | HEIGHT: 72 IN | HEART RATE: 86 BPM | DIASTOLIC BLOOD PRESSURE: 80 MMHG | RESPIRATION RATE: 15 BRPM | WEIGHT: 230 LBS | BODY MASS INDEX: 31.15 KG/M2

## 2020-10-01 DIAGNOSIS — Z95.810 PRESENCE OF AUTOMATIC (IMPLANTABLE) CARDIAC DEFIBRILLATOR: ICD-10-CM

## 2020-10-01 DIAGNOSIS — I50.22 CHRONIC SYSTOLIC (CONGESTIVE) HEART FAILURE: ICD-10-CM

## 2020-10-01 DIAGNOSIS — J90 PLEURAL EFFUSION, NOT ELSEWHERE CLASSIFIED: ICD-10-CM

## 2020-10-01 PROCEDURE — 99213 OFFICE O/P EST LOW 20 MIN: CPT

## 2020-10-01 PROCEDURE — 93000 ELECTROCARDIOGRAM COMPLETE: CPT

## 2021-01-26 ENCOUNTER — APPOINTMENT (OUTPATIENT)
Dept: CARDIOLOGY | Facility: CLINIC | Age: 66
End: 2021-01-26
Payer: MEDICARE

## 2021-01-26 VITALS
DIASTOLIC BLOOD PRESSURE: 78 MMHG | HEIGHT: 72 IN | HEART RATE: 80 BPM | SYSTOLIC BLOOD PRESSURE: 124 MMHG | WEIGHT: 236 LBS | RESPIRATION RATE: 15 BRPM | BODY MASS INDEX: 31.97 KG/M2

## 2021-01-26 PROCEDURE — 99072 ADDL SUPL MATRL&STAF TM PHE: CPT

## 2021-01-26 PROCEDURE — 93306 TTE W/DOPPLER COMPLETE: CPT

## 2021-01-26 PROCEDURE — 99214 OFFICE O/P EST MOD 30 MIN: CPT

## 2021-01-26 NOTE — DISCUSSION/SUMMARY
[FreeTextEntry1] : This is a 65-year-old male with past medical history significant for coronary artery disease status post ST elevation myocardial infarction, followed by emergent coronary artery bypass surgery May 27, 2016 at [facility], left ventricular dysfunction, history of hypertension, hyperlipidemia, mitral valve regurgitation, status post AICD, comes in for followup cardiac evaluation. \par He denies chest pain, shortness of breath, dizziness or syncope.  He is followed closely by the heart failure team at [facility].\par The patient is doing well at the current time.\par He will have an echo Doppler examination later today to evaluate his left ventricular function, chamber size, murmur, and rule out hypertrophy.  The patient reports that he had recent blood work done through [facility].  Results of which is still pending.\par He will continue on his usual medication.\par He will follow-up with me in 3 months.\par Electrocardiogram done October 1, 2020 demonstrated normal sinus rhythm rate of 86 beats per minutes otherwise marked both right bundle branch block, Q waves in lead one, aVL, and nonspecific ST-T wave flattening.\par Lab work done September 16, 2020 demonstrated direct LDL of 74 mg/dL, cholesterol 120, HDL 35, triglycerides 149 mg/dL.  He will do blood work as noted above.  If his LDL cholesterol is not improved, I would consider switching his Lipitor 80 mg per day Crestor 40 mg per day continuing Zetia therapy.\par \par PMH:\par The patient was hospitalized at [facility] in March of 2020 with diarrhea.  He reports that he was diagnosed with C. difficile, but I have no documentation to confirm that.  He was also told that he had thickened gallbladder walls and should follow up with a surgeon.  He does not have the report of this abnormal gallbladder CAT scan result.\par I recommend he see a general surgeon had given him the name of Dr. Regis Wilcox.\par He has restarted his Entresto but is on a low dose of Coreg 3.125 mg twice per day. \par \par \par PMH:\par The patient is on Entresto and followed closely by Dr. Bennett of [facility]. \par The patient had an echo Doppler examination November 2016 which demonstrated severe left ventricular dysfunction with an estimated ejection fraction 37% the patient had moderate to severe mitral valve regurgitation, moderate tricuspid valve regurgitation. He is feeling well at the current time. A cardiac catheterization done at [facility] May 27, 2016 demonstrated  May 27, 2016 demonstrated large thrombus in the distal left main coronary artery, and the patient had an emergent thrombectomy and angioplasty. He was placed on intra-aortic balloon pump. He was noted to have severe mitral valve regurgitation at that time in the setting of his ST elevation myocardial infarction, 50% lesion the mid left anterior descending artery, 60% lesion the second diagonal artery, 50% lesion in the ramus branch, and 40% lesion in the posterior descending artery.\par \par The patient understands that aerobic exercises must be increased to 40 minutes 4 times per week. A detailed discussion of lifestyle modification was done today. The patient has a good understanding of the diagnosis, and treatment plan. Lifestyle modification was also outlined.

## 2021-01-26 NOTE — PHYSICAL EXAM
[General Appearance - Well Developed] : well developed [General Appearance - Well Nourished] : well nourished [Normal Conjunctiva] : the conjunctiva exhibited no abnormalities [Normal Oral Mucosa] : normal oral mucosa [Normal Jugular Venous A Waves Present] : normal jugular venous A waves present [Normal Jugular Venous V Waves Present] : normal jugular venous V waves present [No Jugular Venous Blanco A Waves] : no jugular venous blanco A waves [Respiration, Rhythm And Depth] : normal respiratory rhythm and effort [Exaggerated Use Of Accessory Muscles For Inspiration] : no accessory muscle use [Auscultation Breath Sounds / Voice Sounds] : lungs were clear to auscultation bilaterally [Bowel Sounds] : normal bowel sounds [Abdomen Soft] : soft [Abnormal Walk] : normal gait [Gait - Sufficient For Exercise Testing] : the gait was sufficient for exercise testing [Nail Clubbing] : no clubbing of the fingernails [Cyanosis, Localized] : no localized cyanosis [Skin Color & Pigmentation] : normal skin color and pigmentation [] : no rash [Oriented To Time, Place, And Person] : oriented to person, place, and time [Affect] : the affect was normal [Mood] : the mood was normal [No Anxiety] : not feeling anxious [5th Left ICS - MCL] : palpated at the 5th LICS in the midclavicular line [Normal] : normal [No Precordial Heave] : no precordial heave was noted [Normal Rate] : normal [Rhythm Regular] : regular [Normal S1] : normal S1 [Normal S2] : normal S2 [No Gallop] : no gallop heard [I] : a grade 1 [2+] : left 2+ [No Abnormalities] : the abdominal aorta was not enlarged and no bruit was heard [No Pitting Edema] : no pitting edema present [Apical Thrill] : no thrill palpable at the apex [Click] : no click [Pericardial Rub] : no pericardial rub

## 2021-01-26 NOTE — REASON FOR VISIT
[Follow-Up - Clinic] : a clinic follow-up of [Cardiomyopathy] : cardiomyopathy [Coronary Artery Disease] : coronary artery disease [Hyperlipidemia] : hyperlipidemia [Hypertension] : hypertension [FreeTextEntry1] : 63-year-old male with past medical history significant for coronary artery disease status post ST elevation myocardial infarction, followed by emergent coronary artery bypass surgery May 27, 2016 at [facility], left ventricular dysfunction, history of hypertension, hyperlipidemia, mitral valve regurgitation, status post AICD, comes in for a follow up cardiology evaluation. \par \par Labs done on 3/5/2020 which demonstrated:\par TC: 123\par T\par HDL: 30\par LDL: 69\par A1C: 6.4

## 2021-01-29 ENCOUNTER — APPOINTMENT (OUTPATIENT)
Dept: ELECTROPHYSIOLOGY | Facility: CLINIC | Age: 66
End: 2021-01-29

## 2021-03-21 ENCOUNTER — TRANSCRIPTION ENCOUNTER (OUTPATIENT)
Age: 66
End: 2021-03-21

## 2021-04-01 ENCOUNTER — APPOINTMENT (OUTPATIENT)
Dept: CARDIOLOGY | Facility: CLINIC | Age: 66
End: 2021-04-01

## 2021-09-22 ENCOUNTER — NON-APPOINTMENT (OUTPATIENT)
Age: 66
End: 2021-09-22

## 2023-07-11 ENCOUNTER — LABORATORY RESULT (OUTPATIENT)
Age: 68
End: 2023-07-11

## 2023-07-11 ENCOUNTER — APPOINTMENT (OUTPATIENT)
Dept: CARDIOLOGY | Facility: CLINIC | Age: 68
End: 2023-07-11
Payer: MEDICARE

## 2023-07-11 ENCOUNTER — NON-APPOINTMENT (OUTPATIENT)
Age: 68
End: 2023-07-11

## 2023-07-11 VITALS
HEIGHT: 72 IN | BODY MASS INDEX: 31.83 KG/M2 | DIASTOLIC BLOOD PRESSURE: 78 MMHG | WEIGHT: 235 LBS | RESPIRATION RATE: 15 BRPM | HEART RATE: 53 BPM | TEMPERATURE: 97 F | OXYGEN SATURATION: 97 % | SYSTOLIC BLOOD PRESSURE: 102 MMHG

## 2023-07-11 DIAGNOSIS — I25.2 OLD MYOCARDIAL INFARCTION: ICD-10-CM

## 2023-07-11 DIAGNOSIS — I10 ESSENTIAL (PRIMARY) HYPERTENSION: ICD-10-CM

## 2023-07-11 DIAGNOSIS — I42.9 CARDIOMYOPATHY, UNSPECIFIED: ICD-10-CM

## 2023-07-11 DIAGNOSIS — E11.9 TYPE 2 DIABETES MELLITUS W/OUT COMPLICATIONS: ICD-10-CM

## 2023-07-11 DIAGNOSIS — I25.10 ATHEROSCLEROTIC HEART DISEASE OF NATIVE CORONARY ARTERY W/OUT ANGINA PECTORIS: ICD-10-CM

## 2023-07-11 DIAGNOSIS — Z95.810 PRESENCE OF AUTOMATIC (IMPLANTABLE) CARDIAC DEFIBRILLATOR: ICD-10-CM

## 2023-07-11 DIAGNOSIS — Z95.1 PRESENCE OF AORTOCORONARY BYPASS GRAFT: ICD-10-CM

## 2023-07-11 DIAGNOSIS — I34.0 NONRHEUMATIC MITRAL (VALVE) INSUFFICIENCY: ICD-10-CM

## 2023-07-11 DIAGNOSIS — I48.0 PAROXYSMAL ATRIAL FIBRILLATION: ICD-10-CM

## 2023-07-11 DIAGNOSIS — E78.5 HYPERLIPIDEMIA, UNSPECIFIED: ICD-10-CM

## 2023-07-11 PROCEDURE — 99215 OFFICE O/P EST HI 40 MIN: CPT | Mod: 25

## 2023-07-11 PROCEDURE — 93000 ELECTROCARDIOGRAM COMPLETE: CPT

## 2023-07-11 NOTE — PHYSICAL EXAM
[Well Developed] : well developed [Well Nourished] : well nourished [No Acute Distress] : no acute distress [Normal Venous Pressure] : normal venous pressure [No Carotid Bruit] : no carotid bruit [Normal S1, S2] : normal S1, S2 [No Rub] : no rub [Clear Lung Fields] : clear lung fields [Good Air Entry] : good air entry [No Respiratory Distress] : no respiratory distress  [Soft] : abdomen soft [Non Tender] : non-tender [No Masses/organomegaly] : no masses/organomegaly [Normal Bowel Sounds] : normal bowel sounds [Normal Gait] : normal gait [No Edema] : no edema [No Cyanosis] : no cyanosis [No Clubbing] : no clubbing [No Varicosities] : no varicosities [No Rash] : no rash [No Skin Lesions] : no skin lesions [Moves all extremities] : moves all extremities [No Focal Deficits] : no focal deficits [Normal Speech] : normal speech [Alert and Oriented] : alert and oriented [Normal memory] : normal memory [General Appearance - Well Developed] : well developed [General Appearance - Well Nourished] : well nourished [Normal Conjunctiva] : the conjunctiva exhibited no abnormalities [Normal Oral Mucosa] : normal oral mucosa [Normal Jugular Venous A Waves Present] : normal jugular venous A waves present [Normal Jugular Venous V Waves Present] : normal jugular venous V waves present [No Jugular Venous Blanco A Waves] : no jugular venous blanco A waves [Respiration, Rhythm And Depth] : normal respiratory rhythm and effort [Exaggerated Use Of Accessory Muscles For Inspiration] : no accessory muscle use [Auscultation Breath Sounds / Voice Sounds] : lungs were clear to auscultation bilaterally [Bowel Sounds] : normal bowel sounds [Abdomen Soft] : soft [Abnormal Walk] : normal gait [Gait - Sufficient For Exercise Testing] : the gait was sufficient for exercise testing [Nail Clubbing] : no clubbing of the fingernails [Cyanosis, Localized] : no localized cyanosis [Skin Color & Pigmentation] : normal skin color and pigmentation [] : no rash [Oriented To Time, Place, And Person] : oriented to person, place, and time [Affect] : the affect was normal [Mood] : the mood was normal [No Anxiety] : not feeling anxious [5th Left ICS - MCL] : palpated at the 5th LICS in the midclavicular line [Normal] : normal [No Precordial Heave] : no precordial heave was noted [Normal Rate] : normal [Rhythm Regular] : regular [Normal S1] : normal S1 [Normal S2] : normal S2 [No Gallop] : no gallop heard [I] : a grade 1 [2+] : left 2+ [No Abnormalities] : the abdominal aorta was not enlarged and no bruit was heard [No Pitting Edema] : no pitting edema present [II] : a grade 2 [S3] : no S3 [S4] : no S4 [Right Carotid Bruit] : no bruit heard over the right carotid [Left Carotid Bruit] : no bruit heard over the left carotid [Right Femoral Bruit] : no bruit heard over the right femoral artery [Left Femoral Bruit] : no bruit heard over the left femoral artery [Apical Thrill] : no thrill palpable at the apex [Click] : no click [Pericardial Rub] : no pericardial rub

## 2023-07-11 NOTE — DISCUSSION/SUMMARY
[FreeTextEntry1] : This is a 68-year-old male with past medical history significant for coronary artery disease status post ST elevation myocardial infarction, followed by emergent coronary artery bypass surgery May 27, 2016 at [facility], left ventricular dysfunction, history of hypertension, hyperlipidemia, status post mitral valve clip, mitral regurgitation, status post AICD, comes in for followup cardiac evaluation. \par He denies chest pain, shortness of breath, dizziness or syncope.\par Electrocardiogram done July 11, 2023 demonstrates sinus bradycardia rate of 53 bpm otherwise remarkable for T wave inversions in leads II, III, aVF, lead I, V5 and V6 with Q waves in lead aVL and small Q-wave in lead I.\par The patient will get me a copy of his last echocardiogram done about 2 months ago at [facility].\par He will have new blood work done today for lipid panel electrolytes.  His LDL target is less than 70 mg/dL.\par He is currently on Lipitor 80 mg daily and Zetia 10 mg/day in addition to his heart failure medications.  (Entresto 97/103 mg twice a day, Lasix 20 mg/day, and will get me a more accurate list of his other medications.\par He will be following up with his heart failure team in the next month.\par Electrocardiogram done October 1, 2020 demonstrated normal sinus rhythm rate of 86 beats per minutes otherwise marked both right bundle branch block, Q waves in lead one, aVL, and nonspecific ST-T wave flattening.\par Lab work done September 16, 2020 demonstrated direct LDL of 74 mg/dL, cholesterol 120, HDL 35, triglycerides 149 mg/dL.  He will do blood work as noted above.  If his LDL cholesterol is not improved, I would consider switching his Lipitor 80 mg per day Crestor 40 mg per day continuing Zetia therapy.\par \par PMH:\par The patient was hospitalized at [facility] in March of 2020 with diarrhea.  He reports that he was diagnosed with C. difficile, but I have no documentation to confirm that.  He was also told that he had thickened gallbladder walls and should follow up with a surgeon.  He does not have the report of this abnormal gallbladder CAT scan result.\par PMH:\par The patient is on Entresto and followed closely by Dr. Bennett of [facility]. \par The patient had an echo Doppler examination November 2016 which demonstrated severe left ventricular dysfunction with an estimated ejection fraction 37% the patient had moderate to severe mitral valve regurgitation, moderate tricuspid valve regurgitation. He is feeling well at the current time. A cardiac catheterization done at [facility] May 27, 2016 demonstrated  May 27, 2016 demonstrated large thrombus in the distal left main coronary artery, and the patient had an emergent thrombectomy and angioplasty. He was placed on intra-aortic balloon pump. He was noted to have severe mitral valve regurgitation at that time in the setting of his ST elevation myocardial infarction, 50% lesion the mid left anterior descending artery, 60% lesion the second diagonal artery, 50% lesion in the ramus branch, and 40% lesion in the posterior descending artery.\par \par The patient understands that aerobic exercises must be increased to 40 minutes 4 times per week. A detailed discussion of lifestyle modification was done today. The patient has a good understanding of the diagnosis, and treatment plan. Lifestyle modification was also outlined.

## 2023-07-11 NOTE — REASON FOR VISIT
[Follow-Up - Clinic] : a clinic follow-up of [Cardiomyopathy] : cardiomyopathy [Coronary Artery Disease] : coronary artery disease [Hyperlipidemia] : hyperlipidemia [Hypertension] : hypertension [FreeTextEntry1] : s/p MI and subsequent CABG, left ventricular dysfunction, cardiac defibrillator

## 2023-09-05 RX ORDER — METFORMIN HYDROCHLORIDE 500 MG/1
500 TABLET, COATED ORAL TWICE DAILY
Refills: 0 | Status: DISCONTINUED | COMMUNITY
Start: 2019-09-09 | End: 2023-09-05

## 2024-01-16 ENCOUNTER — APPOINTMENT (OUTPATIENT)
Dept: CARDIOLOGY | Facility: CLINIC | Age: 69
End: 2024-01-16

## 2024-03-07 ENCOUNTER — APPOINTMENT (OUTPATIENT)
Dept: CARDIOLOGY | Facility: CLINIC | Age: 69
End: 2024-03-07

## 2024-11-07 DIAGNOSIS — I34.0 NONRHEUMATIC MITRAL (VALVE) INSUFFICIENCY: ICD-10-CM

## 2025-05-21 ENCOUNTER — NON-APPOINTMENT (OUTPATIENT)
Age: 70
End: 2025-05-21

## 2025-05-22 ENCOUNTER — APPOINTMENT (OUTPATIENT)
Dept: CARDIOLOGY | Facility: CLINIC | Age: 70
End: 2025-05-22
Payer: MEDICARE

## 2025-05-22 PROCEDURE — 93306 TTE W/DOPPLER COMPLETE: CPT
